# Patient Record
Sex: MALE | Race: WHITE
[De-identification: names, ages, dates, MRNs, and addresses within clinical notes are randomized per-mention and may not be internally consistent; named-entity substitution may affect disease eponyms.]

---

## 2018-03-19 ENCOUNTER — HOSPITAL ENCOUNTER (INPATIENT)
Dept: HOSPITAL 68 - ERH | Age: 53
LOS: 3 days | DRG: 65 | End: 2018-03-22
Attending: INTERNAL MEDICINE | Admitting: INTERNAL MEDICINE
Payer: COMMERCIAL

## 2018-03-19 VITALS — WEIGHT: 205.37 LBS | BODY MASS INDEX: 32.61 KG/M2 | HEIGHT: 66.5 IN

## 2018-03-19 DIAGNOSIS — I16.1: ICD-10-CM

## 2018-03-19 DIAGNOSIS — Z79.84: ICD-10-CM

## 2018-03-19 DIAGNOSIS — Z91.14: ICD-10-CM

## 2018-03-19 DIAGNOSIS — I63.411: Primary | ICD-10-CM

## 2018-03-19 DIAGNOSIS — D72.829: ICD-10-CM

## 2018-03-19 DIAGNOSIS — E66.9: ICD-10-CM

## 2018-03-19 DIAGNOSIS — E78.5: ICD-10-CM

## 2018-03-19 DIAGNOSIS — I69.351: ICD-10-CM

## 2018-03-19 DIAGNOSIS — I50.32: ICD-10-CM

## 2018-03-19 DIAGNOSIS — I11.0: ICD-10-CM

## 2018-03-19 DIAGNOSIS — Z87.891: ICD-10-CM

## 2018-03-19 DIAGNOSIS — E11.9: ICD-10-CM

## 2018-03-19 LAB
ABSOLUTE GRANULOCYTE CT: 8.6 /CUMM (ref 1.4–6.5)
BASOPHILS # BLD: 0 /CUMM (ref 0–0.2)
BASOPHILS NFR BLD: 0.3 % (ref 0–2)
EOSINOPHIL # BLD: 0.1 /CUMM (ref 0–0.7)
EOSINOPHIL NFR BLD: 1 % (ref 0–5)
ERYTHROCYTE [DISTWIDTH] IN BLOOD BY AUTOMATED COUNT: 13.5 % (ref 11.5–14.5)
GRANULOCYTES NFR BLD: 69.7 % (ref 42.2–75.2)
HCT VFR BLD CALC: 45.6 % (ref 42–52)
LYMPHOCYTES # BLD: 2.2 /CUMM (ref 1.2–3.4)
MCH RBC QN AUTO: 28.3 PG (ref 27–31)
MCHC RBC AUTO-ENTMCNC: 33.5 G/DL (ref 33–37)
MCV RBC AUTO: 84.5 FL (ref 80–94)
MONOCYTES # BLD: 1.3 /CUMM (ref 0.1–0.6)
PLATELET # BLD: 269 /CUMM (ref 130–400)
PMV BLD AUTO: 9.2 FL (ref 7.4–10.4)
RED BLOOD CELL CT: 5.4 /CUMM (ref 4.7–6.1)
WBC # BLD AUTO: 12.3 /CUMM (ref 4.8–10.8)

## 2018-03-19 PROCEDURE — 86618 LYME DISEASE ANTIBODY: CPT

## 2018-03-19 PROCEDURE — 70551 MRI BRAIN STEM W/O DYE: CPT

## 2018-03-19 NOTE — CT SCAN REPORT
EXAMINATION:
CT HEAD WITHOUT CONTRAST
 
CLINICAL INFORMATION:
Right-sided numbness
 
COMPARISON:
None
 
TECHNIQUE:
Contiguous axial imaging was performed from the skull base to vertex without
intravenous administration of contrast.
 
 
 
FINDINGS: There are areas of decreased attenuation on the left here. Adjacent
to the body of left lateral ventricle and some decreased attenuation in the
region of the basal ganglia/posterior limb of the internal capsule on the
left. Also head of the caudate. Importantly there is no hemorrhage here. No
midline shift or definitive mass effect. Basal cisterns appear patent.
Posterior fossa risk grossly within normal limits. No extra-axial collection.
 
Decreased attenuation right occipital region. This may represent an area of
old infarction. Again no mass effect.
 
IMPRESSION: Scattered areas of decreased attenuation here are noted. Most
left-sided. Some these may represent old infarcts but others are more
indeterminate and may be acute or subacute in nature. Given the history MR
including diffusion-weighted imaging would be recommended to further
evaluate. Importantly there is no hemorrhage or midline shift or mass effect
here.

## 2018-03-19 NOTE — RADIOLOGY REPORT
EXAMINATION:
XR PORTABLE CHEST
 
CLINICAL INFORMATION:
Hypertensive
 
COMPARISON:
None
 
TECHNIQUE:
Portable frontal view of the chest was obtained.
 
FINDINGS:
No finding in this portable study. There is no infiltrate or failure. No
effusion. Some limitation from patient body habitus.
 
IMPRESSION:
Negative acute portable chest

## 2018-03-19 NOTE — ED NEURO DEFICIT/STROKE
History of Present Illness
 
General
Chief Complaint: General Adult
Stated Complaint: LIGHTHEADED, NUMB ON RIGHT SIDE PER PT
Source: patient, family, old records
Exam Limitations: no limitations
 
Vital Signs & Intake/Output
Vital Signs & Intake/Output
 Vital Signs
 
 
Date Time Temp Pulse Resp B/P B/P Pulse O2 O2 Flow FiO2
 
     Mean Ox Delivery Rate 
 
2229 97.2 65 20 191/88  97 Room Air  
 
2211  70 20 212/99     
 
2147  72 20 204/104     
 
2138 97.0 72 20 204/104  97 Room Air  
 
2114    180/90     
 
2102  72 20 200/100     
 
2054  72 20 200/100  98 Room Air  
 
2053 97.0 73 20 214/107  97 Room Air  
 
2023 98.1 75 18   95 Room Air  
 
 
 
Allergies
Coded Allergies:
No Known Allergies (18)
 
Triage Note:
PT FROM HOME C/O DIZZNIESS X1 DAY, RIGHT SIDED
"NUMBNESS". PT IS NON COMPLIANT WITH HTN
MEDICATION, HYPERLIPIDEMIA MEDICATION AND DOES NOT
CHECK BSG DAILY. PT STATES BLURRY VISION
BILATERALLY SINCE YESTERDAY AND RIGHT SIDED
NUMBNESS SINCE 630. PT HYPERTENSIVE IN TRIAGE
221/116. PTS HR 80, AFEBRILE. PT IS NEGATIVE NIH
STROKE SCALE AT THIS TIME.
Triage Nurses Notes Reviewed? yes
Onset: Abrupt
Duration: day(s): (1), constant
Timing: recent history
Severity: moderate
Altered Sensations: RUE, RLE
Vision Problem? No
HPI:
52-year-old male noncompliant with his medication history of hypertension 
diabetes high cholesterol presents complaining of room spinning dizziness 
yesterday associated with right-sided arm and leg numbness heaviness today.  He 
also states that he has had difficulty articulating his words however there's 
been no slurred speech per family.  No facial droop.  No recent fall or head 
trauma.  On arrival patient is noted to be hypertensive.  He denies any chest 
pain shortness of breath.  No neck arm or leg pain abdominal pain nausea or 
vomiting.
 
No history of similar symptoms in the past.  His wife states that he appears to 
be shuffling his right foot.  No weakness in his arm or leg no vision changes he
took 2 full dose aspirin prior to arrival
(Agustin CARMONA,Kvng)
 
Past History
 
Travel History
Traveled to [place] past 21 day No
 
Medical History
Any Pertinent Medical History? see below for history
Cardiovascular: hypertension, hyperlipidemia
Endocrine: diabetes
 
Surgical History
Surgical History: non-contributory
 
Psychosocial History
What is your primary language English
Tobacco Use: Quit >30 days ago
 
Family History
Hx Contributory? No
(Kvng Stoll)
 
Review of Systems
 
Review of Systems
Constitutional:
Reports: see HPI. 
Comments
Review of systems: See HPI, All other systems negative.
Constitutional, no chills no fever,
HEENT:  no sore throat no congestion
Cardiovascular: No chest pain , no palpitation
Skin:  no rashes, no change in skin
Respiratory: No dyspnea no cough no  sputum 
GI: No nausea no vomiting, no diarrhea
: No dysuria 
Muscle skeletal: No joint pain, no back pain, no neck pain,
Neurologic: , no headache
Heme/endocrine: No bruising 
Immunology: No lymphadenopathy
(Agustin CARMONA,Kvng)
 
Physical Exam
 
Physical Exam
General Appearance: well developed/nourished, alert, awake
Psychiatric: awake, alert, oriented x 3
Cranial Nerves: normal hearing, normal speech, PERRL
Coordination/Gait: normal finger to nose, normal gait
Motor/Sensory: no motor/sensory deficits
Skin: intact
Comments:
Well-developed well-nourished person in no acute distress
HEENT: Normal EENT exam; PERRL, EOMI, no nystagmus. HEAD is atraumatic. moist 
mucous membranes.  
Neck: Supple, y, normal range of motion 
Back: Nontender, no CVA tenderness. Full range of motion
Cardiovascular: Regular rate and rhythms no murmurs
Respiratory: . No respiratory distress.  Patient speaking in full complete 
sentences. Breath sounds clear to auscultation bilaterally: NO W/R/R
Abdomen: Soft, nontender nondistended, no appreciable organomegaly. Normal bowel
sounds. No rebound/guarding,
Extremity: No edema, full range of motion of extremities, normal and equal 
pulses bilaterally, 5 out of 5 strength noted to bilateral upper and lower 
extremities
Neuro: Alert oriented x3, motor sensory normal, cranial nerves II through XII 
grossly intact. There were no obvious focal neurologic abnormalities.
Skin: No appreciable rash on exposed skin, skin is warm and dry.
Psych: Mood and affect is normal, memory and judgment is normal.
 
Core Measures
CVA/TIA Diagnosis: Yes
NIH Stroke Scale
 
 
NIH Stroke Scale Response Value
 
Level of Consciousness alert 0
 
LOC Questions answers one correctly 1
 
LOC Commands obeys both correctly 0
 
Best Gaze normal 0
 
Visual Fields no visual loss 0
 
Facial Paresis normal 0
 
Motor Arm - Left no drift 0
 
Motor Arm - Right no drift 0
 
Motor Leg - Left no drift 0
 
Motor Leg - Right no drift 0
 
Limb Ataxia no ataxia 0
 
Sensory normal 0
 
Best Language no aphasia 0
 
Dysarthria normal articulation 0
 
Extinction and Inattention no neglect 0
 
Total   1
 
 
Date Last Known Well: 18
Time Last Known Well: 0600
Symptom Start Date: 18
Symptom Start Time: 06
Swallow Evaluation Pass
Swallow eval date 18
Swallow eval time 
Sepsis Present: No
Sepsis Focused Exam Completed? No
(Kvng Stoll)
 
Progress
Differential Diagnosis: drug intoxication, electrolyte imbalance, intracranial 
Hem., intracranial mass/tumor, stroke, vertebrobasilar insuff., LACUNAR INFARCT,
HYPERTENSIVE URGENCY VS EMERGENCY
Plan of Care:
 Orders
 
 
Procedure Date/time Status
 
Heart Healthy Diet  D Active
 
Patient Data 2242 Active
 
ED Holding Orders 2226 Active
 
Admit to inpatient 2226 Active
 
Vital Signs 2226 Active
 
Code Status 2226 Active
 
Telemetry/Cardiac Monitor 2052 Active
 
TROPONIN LEVEL 2023 Complete
 
COMPREHENSIVE METABOLIC PANEL 2023 Complete
 
CBC WITHOUT DIFFERENTIAL 2023 Complete
 
EKG 2023 Active
 
 
 Laboratory Tests
 
 
 
18:
Anion Gap 15, Estimated GFR > 60, BUN/Creatinine Ratio 15.7, Glucose 122  H, 
Calcium 9.5, Total Bilirubin 0.8, AST 50,   H, Alkaline Phosphatase 70, 
Troponin I 0.03, Total Protein 7.6, Albumin 4.6, Globulin 3.0, Albumin/Globulin 
Ratio 1.5, CBC w Diff NO MAN DIFF REQ, RBC 5.40, MCV 84.5, MCH 28.3, MCHC 33.5, 
RDW 13.5, MPV 9.2, Gran % 69.7, Lymphocytes % 18.2  L, Monocytes % 10.8  H, 
Eosinophils % 1.0, Basophils % 0.3, Absolute Granulocytes 8.6  H, Absolute 
Lymphocytes 2.2, Absolute Monocytes 1.3  H, Absolute Eosinophils 0.1, Absolute 
Basophils 0
Symptoms started approximately 6 AM when the patient woke up he is outside the 
TPA window.  There is no neurologic deficits noted at this time.  Patient 
medicated labetalol labs ordered CAT scan chest XRAY ordered.  Case discussed 
with Dr. Chaudhari agrees with plan
 
 
 
Discussed with the patient and his wife is CAT scan and lab results.  And need 
for admission he took 2 full dose aspirin prior to arrival patient tolerate 
swallow evaluation well.  Case discussed with Dr. brothers will admit
Diagnostic Imaging:
Viewed by Me: Radiology Read, CT Scan.  Discussed w/RAD: Radiology Read, CT 
Scan. 
Radiology Impression: PATIENT: ANGLE ANN  MEDICAL RECORD NO: [id number] 
PRESENT AGE: 52  PATIENT ACCOUNT NO: [id number] : [birth year]  LOCATION: [facility] 
ORDERING PHYSICIAN: Kvng CARMONA     SERVICE DATE:  EXAM TYPE: 
RAD - XRY-PORTABLE CHEST XRAY EXAMINATION: XR PORTABLE CHEST CLINICAL 
INFORMATION: Hypertensive COMPARISON: None TECHNIQUE: Portable frontal view of 
the chest was obtained. FINDINGS: No finding in this portable study. There is no
infiltrate or failure. No effusion. Some limitation from patient body habitus. 
IMPRESSION: Negative acute portable chest DICTATED BY: Calderon Flores MD  DATE/
TIME DICTATED:18 :RAD.CARABALLO  DATE/TIME TRANSCRIBED:
18 CONFIDENTIAL, DO NOT COPY WITHOUT APPROPRIATE AUTHORIZATION.  <
Electronically signed in Other Vendor System>                                   
                                                    SIGNED BY: Calderon Flores MD
18, PATIENT: ANGLE ANN  MEDICAL RECORD NO: [id number] PRESENT AGE:
52  PATIENT ACCOUNT NO: [id number] : [birth year]  LOCATION: [facility] ORDERING PHYSICIAN:
Kvng CARMONA     SERVICE DATE:  EXAM TYPE: CAT - CT HEAD WO IV 
CONTRAST EXAMINATION: CT HEAD WITHOUT CONTRAST CLINICAL INFORMATION: Right-sided
numbness COMPARISON: None TECHNIQUE: Contiguous axial imaging was performed from
the skull base to vertex without intravenous administration of contrast. 
FINDINGS: There are areas of decreased attenuation on the left here. Adjacent to
the body of left lateral ventricle and some decreased attenuation in the region 
of the basal ganglia/posterior limb of the internal capsule on the left. Also 
head of the caudate. Importantly there is no hemorrhage here. No midline shift 
or definitive mass effect. Basal cisterns appear patent. Posterior fossa risk 
grossly within normal limits. No extra-axial collection. Decreased attenuation 
right occipital region. This may represent an area of old infarction. Again no 
mass effect. IMPRESSION: Scattered areas of decreased attenuation here are 
noted. Most left-sided. Some these may represent old infarcts but others are 
more indeterminate and may be acute or subacute in nature. Given the history MR 
including diffusion-weighted imaging would be recommended to further evaluate. 
Importantly there is no hemorrhage or midline shift or mass effect here. 
DICTATED BY: Calderon Flores MD  DATE/TIME DICTATED:18 
:RAD.CARABALLO  DATE/TIME TRANSCRIBED:18 CONFIDENTIAL, 
DO NOT COPY WITHOUT APPROPRIATE AUTHORIZATION.  <Electronically signed in Other 
Vendor System>                                                                  
                     SIGNED BY: Calderon Flores MD 18
Initial ED EKG: NSR AT 70, T WAVE INVERSIONS LATERAL LEADS, NORMAL AXIS
Rhythm Strip: normal sinus rhythm
(Kvng Stoll)
 
Departure
 
Departure
Time of Disposition: 
Disposition: STILL A PATIENT
Condition: Stable
Clinical Impression
Primary Impression: CVA (cerebral vascular accident)
Secondary Impressions: Hypertensive urgency
Referrals:
Neo Anthony DO (PCP/Family)
 
Departure Forms:
Customer Survey
General Discharge Information
 
Admission Note
Spoke With:
Gem Brothers MD
Documentation of Exam:
Documentation of any treatments & extenuating circumstances including Concerns 
Regarding Discharge (functional status, medication knowledge or non-compliance, 
living conditions, etc.) that warrant an admission rather than observation: 
neuro, cardio consult, MRI, Neuro checks, trend labs tele moniotiring, premature
discharge would be medically harmful
 
(Kvng Stoll)
 
PA/NP Co-Sign Statement
Statement:
ED Attending supervision documentation-
 
[X] I saw and evaluated the patient. I have also reviewed all the pertinent lab 
results and diagnostic results. I agree with the findings and the plan of care 
as documented in the PA's/NP's documentation. 
 
[X] I have reviewed the ED Record and agree with the PA's/NP's documentation.
 
[] Additions or exceptions (if any) to the PAs/NP's note and plan are 
summarized below:
[ADMIT TO TELE, SERIAL ENZYMES, TELE MONITORING, NEUROLOGY CONSULTATION]
 
(Watson KAMARA,Dangelo BOB)
 
 
(Watson KAMARA,Dangelo BOB)

## 2018-03-19 NOTE — HISTORY & PHYSICAL
Thomas KAMARA,[facility] 03/19/18 3618:
General Information and HPI
MD Statement:
I have seen and personally examined ANGLE HERNANDEZ and documented this H&P.
 
The patient is a 52 year old M who presented with a patient stated chief 
complaint of [right sided numbness].
 
Source of Information: patient, family
Exam Limitations: no limitations
History of Present Illness:
Mr. Hernandez is a 52-year-old gentleman with past medical history significant for 
hypertension, hyperlipidemia and diabetes noncompliant with medications presents
with right-sided numbness/heaviness and slurring of speech that started this 
morning.
According to the patient, he has been feeling lightheaded or dizzy since 
yesterday but after waking up this morning he noticed numbness on the entire 
right side and difficulty articulating words with slurring of speech. He went to
work but continued to have the symptoms and took 2 regular aspirin tablets.  
Also reports slight blurring of vision on and off that started around mid day.  
Returned from work around 4 PM, when the wife noticed that he is off balance, 
trying to hold on things while walking with right foot shuffling and also 
noticed slurred speech.  Wife checked his blood sugar level at that time which 
was 240, also gave him his blood pressure medications and brought him to the ER.
 He denies any headache, loss of consciousness, confusion, facial droop, 
previous similar episodes, recent change in medications, over-the-counter 
medications and a recent illnesses.  He says he does not feel any numbness on 
the right side any more and the slurred speech and blurry vision has resolved 
but he feels tired.
She has not been taking his medications for the past couple of months, and even 
before that has been taking them on and off. 
 
Allergies/Medications
Allergies:
Coded Allergies:
No Known Allergies (03/19/18)
 
 
Past History
 
Travel History
Traveled to [place] past 21 day No
 
Medical History
Cardiovascular: hypertension, hyperlipidemia
Endocrine: diabetes
 
Surgical History
Surgical History: non-contributory
 
Past Family/Social History
 
Psychosocial History
Where do you live? Home
Who Do You Live With? spouse
Primary Language: English
Smoking Status: Former Smoker
ETOH Use: denies use
Illicit Drug Use: denies illicit drug use
 
Functional Ability
ADLs
Independent: dressing, eating, toileting, bathing. 
Ambulation: independent
IADLs
Independent: shopping, housework, finances, food prep, telephone, transportation
, medication admin. 
 
Review of Systems
 
Review of Systems
Constitutional:
Reports: malaise, weakness. 
EENTM:
Reports: blurred vision. 
Cardiovascular:
Reports: no symptoms. 
Respiratory:
Reports: no symptoms. 
GI:
Reports: no symptoms. 
Genitourinary:
Reports: no symptoms. 
Musculoskeletal:
Reports: no symptoms. 
Skin:
Reports: no symptoms. 
Neurological/Psychological:
Reports: numbness. 
Hematologic/Endocrine:
Reports: no symptoms. 
Immunologic/Allergic:
Reports: no symptoms. 
All Other Systems: Reviewed and Negative
 
Exam & Diagnostic Data
Last 24 Hrs of Vital Signs/I&O
 Vital Signs
 
 
Date Time Temp Pulse Resp B/P B/P Pulse O2 O2 Flow FiO2
 
     Mean Ox Delivery Rate 
 
03/20 0214  62  178/102     
 
03/20 0126 98.2 69 20 190/100  94 Room Air  
 
03/19 2346 97.3 62 18 185/95  97 Room Air  
 
03/19 2229 97.2 65 20 191/88  97 Room Air  
 
03/19 2211  70 20 212/99     
 
03/19 2147  72 20 204/104     
 
03/19 2138 97.0 72 20 204/104  97 Room Air  
 
03/19 2114    180/90     
 
03/19 2102  72 20 200/100     
 
03/19 2054  72 20 200/100  98 Room Air  
 
03/19 2053 97.0 73 20 214/107  97 Room Air  
 
03/19 2023 98.1 75 18   95 Room Air  
 
 
 Intake & Output
 
 
 03/20 0800 03/20 0000 03/19 1600
 
Intake Total   
 
Output Total   
 
Balance   
 
    
 
Patient  230 lb 
 
Weight   
 
Weight  Reported by Patient 
 
Measurement   
 
Method   
 
 
 
 
Physical Exam
General Appearance Alert, Oriented X3, Cooperative, No Acute Distress
Skin No Rashes, No Breakdown
HEENT Atraumatic, PERRLA, EOMI, Mucous Membr. moist/pink
Neck Supple, No JVD
Cardiovascular Regular Rate, Normal S1, Normal S2, No Murmurs
Lungs Clear to Auscultation, Normal Air Movement
Abdomen Normal Bowel Sounds, Soft, No Tenderness
Neurological Normal Gait, Normal Speech, Strength at 5/5 X4 Ext, Normal Tone, 
Sensation Intact, Cranial Nerves 3-12 NL, Reflexes 2+, positive Romberg's test, 
per patient right foot dragging while walking and feels stiff,  ,    negative 
cerebellar signs, no pronator drift
Extremities No Clubbing, No Cyanosis, No Edema, Normal Pulses
Last 24 Hrs of Labs/Chon:
 Laboratory Tests
 
03/20/18 0140:
Hemoglobin A1c Pending, Troponin I Pending, Triglycerides Pending, Cholesterol 
Pending, LDL Cholesterol, Calc Pending, HDL Cholesterol Pending, Cholesterol/HDL
Ratio Pending
 
03/19/18 2032:
Anion Gap 15, Estimated GFR > 60, BUN/Creatinine Ratio 15.7, Glucose 122  H, 
Calcium 9.5, Total Bilirubin 0.8, AST 50,   H, Alkaline Phosphatase 70, 
Troponin I 0.03, Total Protein 7.6, Albumin 4.6, Globulin 3.0, Albumin/Globulin 
Ratio 1.5, CBC w Diff NO MAN DIFF REQ, RBC 5.40, MCV 84.5, MCH 28.3, MCHC 33.5, 
RDW 13.5, MPV 9.2, Gran % 69.7, Lymphocytes % 18.2  L, Monocytes % 10.8  H, 
Eosinophils % 1.0, Basophils % 0.3, Absolute Granulocytes 8.6  H, Absolute 
Lymphocytes 2.2, Absolute Monocytes 1.3  H, Absolute Eosinophils 0.1, Absolute 
Basophils 0
 
 
Diagnostic Data
EKG Results
Normal Sinus Rhythm
Heart rate 69
T-wave inversion in leads 1, aVL,V5-6. 
QTc 437
CXR Results
FINDINGS:
No finding in this portable study. There is no infiltrate or failure. No
effusion. Some limitation from patient body habitus.
 
IMPRESSION:
Negative acute portable chest
Other Results
CT HEAD WO IV CONTRAST
FINDINGS: There are areas of decreased attenuation on the left here. Adjacent
to the body of left lateral ventricle and some decreased attenuation in the
region of the basal ganglia/posterior limb of the internal capsule on the
left. Also head of the caudate. Importantly there is no hemorrhage here. No
midline shift or definitive mass effect. Basal cisterns appear patent.
Posterior fossa risk grossly within normal limits. No extra-axial collection.
 
Decreased attenuation right occipital region. This may represent an area of
old infarction. Again no mass effect.
 
IMPRESSION: Scattered areas of decreased attenuation here are noted. Most
left-sided. Some these may represent old infarcts but others are more
indeterminate and may be acute or subacute in nature. Given the history MR
including diffusion-weighted imaging would be recommended to further
evaluate. Importantly there is no hemorrhage or midline shift or mass effect
here.
 
Assessment/Plan
Assessment:
Mr. Hernandez is a 52-year-old gentleman with past medical history significant for 
hypertension, hyperlipidemia and diabetes noncompliant with medications presents
with right-sided numbness/heaviness and slurring of speech that started this 
morning.
 
Problem list
1.  Hypertensive emergency from medication noncompliance
2.  Stroke
3.  History of hyperlipidemia and diabetes
4.  Leukocytosis
 
- We will admit the patient to telemetry floor.
- Neuro checks every 4 hours
- Patient received IV Labetolol 10mg  2 and Enalaprilat 1.25mg once in ER, 
blood pressure down to 178/110 from 214/107. 
- Continue metoprolol and lisinopril, will goal systolic blood pressure of 170.
- Willl start the Patient on Aspirin 81 mg daily.
- Continue Crestor 40 mg daily.
- MRI brain in am. 
- Carotid ultrasound.
- Troponins and EKG 3 to rule out ACS.
- Echocardiogram was in April,2017 showing an ejection fraction of greater than 
65% and mild to moderate left ventricle hypertrophy.  We will repeat 
echocardiogram given medication noncompliance and persistently elevated blood 
pressure.
- Check lipid panel, A1c, TSH and free T4.
- Obtain U tox.
- Neurology consult in a.m.
- Cardiology consult in a.m.
- Patient passed bedside swallow evaluation. 
- PT OT evaluation.
- We will hold metformin and start the patient on NovoLog sliding scale and Accu
-Cheks.
 
DVT prophylaxis; subcutaneous Lovenox
Patient is full code
 
As Ranked By This Provider
Problem List:
 1. CVA (cerebral vascular accident)
 
 2. Hypertensive emergency
 
 
Core Measures/Misc (9/17)
 
Acute Coronary Syndrome
ACS Diagnosis: No
 
Congestive Heart Failure
Congestive Heart Failure Diagnosis No
 
Cerebrovascular Accident
CVA/TIA Diagnosis: Yes
Date Last Known Well: 03/19/18
Time Last Known Well: 0600
Symptom Start Date: 03/19/18
Symptom Start Time: 0600
Swallow Evaluation Pass
 
VTE (View Protocol)
VTE Risk Factors Age>40
No Mechanical VTE Prophylaxis d/t N/A MechProphylax Ordered
No VTE Pharm Prophylaxis d/t NA PharmProphylax ordered
 
Sepsis (View protocol)
Sepsis Present: No
 
Willy Irizarry 03/20/18 0124:
General Information and HPI
 
Allergies/Medications
Home Med list
Lisinopril 20 MG TABLET   1 TAB PO DAILY htn  (Reported)
Metformin HCl 500 MG TABLET   1 TAB PO BID diabetes  (Reported)
Metoprolol Tartrate 50 MG TABLET   1 TAB PO BID hypertension  (Reported)
Rosuvastatin Calcium (Crestor) 40 MG TABLET   1 TAB PO DAILY hlp  (Reported)
 
 
 
Resident Review Statement
Resident Statement: examined this patient, discussed with intern, agreed with 
intern, discussed with family, reviewed EMR data (avail), discussed with nursing
, discussed with case mgmt, reviewed images, amended to note
Other Findings:
This is a 52-year-old male with past medical history significant for 
hypertension, hyperlipidemia, type 2 diabetes mellitus, noncompliant with 
medications presented to the emergency room with chief complaint of right-sided 
numbness since morning.
 
Patient has history of hypertension, hyperlipidemia, diabetes mellitus for last 
2-3 years.  He is very noncompliant with all the medications.  He takes them on 
and off.  He stopped taking lisinopril, metoprolol, Crestor, metformin since 10/
2017.  Never checks blood pressure or blood sugar at home.  He follows up with 
his primary care physician.  Echocardiogram was done in 2017 which showed left 
ventricular hypertrophy, normal systolic and diastolic function.
 
Patient was in his usual state of health until 03/19/2018 6:30 AM.  He states 
that he has right-sided numbness, pins and needles sensation around 6:30 AM.  
However he went to his work, around midday he noticed blurry vision, on and off.
 By the time he reached home his wife noticed that he has slurry speech and 
shuffling gait.  He then presented to the emergency room for further evaluation.
 He took aspirin 3252 at his work.  Patient reports lightheadedness/dizziness 
yesterday.  He denied any weakness/strength abnormalities. 
 
Review of systems positive for right-sided numbness, pins and needles sensations
, right-sided stiffness.  Denies any vision abnormalities, speech abnormalities 
in the emergency room.  He is alert awake and oriented 3.  Denied any chest 
pain, palpitations, short of breath, fever, chills, productive cough, headache, 
neck pain, nausea, vomiting, abdominal pain, change in bladder or bowel habits. 
He quit smoking years back.  Denied alcohol abuse, illicit drug abuse.
 
--------------------------------------------------------------------------------
----------
 
Vitals 
afebrile, heart rate 62, respiratory rate 18, blood pressure 214/107- 180/90, 
after receiving IV labetalol 10 mg twice, enalaprit, respiratory rate 18, 
saturating at 97 on room
 
on exam
Well-developed well-nourished person in no acute distress
HEENT: PERRL, EOMI, no nystagmus. HEAD is atraumatic. moist mucous membranes.  
Neck: Supple,normal range of motion 
Cardiovascular: Regular rate and rhythms no murmurs
Respiratory: . No respiratory distress.  Breath sounds clear to auscultation 
bilaterally
Abdomen: Soft, nontender nondistended, Normal bowel sounds.
Extremity: No edema, full range of motion of extremities, normal and equal 
pulses bilaterally
5 out of 5 strength noted in bilateral upper and lower extremities
Alert oriented x3, 
sensory exam normal, cranial nerves II through XII grossly intact. There were no
obvious focal neurologic abnormalities.
Cerebellar exam-normal finger-nose test, no dysdiadochokinesia.  Positive 
Romberg's test.  Normal gait.  Reports right-sided stiffness when he walks
 
Pertinent labs
WBC 12.3, hemoglobin 15, hematocrit 40, platelets 269
CMP within normal limits

EKG normal sinus rhythm, rate 69, no ST-T wave changes.  T-wave inversions V5, 
V6, 1 and aVL
Head CT - Scattered areas of decreased attenuation  are noted. Most left-sided. 
Some these may represent old infarcts but others are more indeterminate and may 
be acute or subacute in nature. there is no hemorrhage or midline shift or mass 
effect
 
 
--------------------------------------------------------------------------------
---------
 
1.  Acute ischemic CVA
Patient reports right-sided numbness, blurry vision, slurred speech, shuffling 
gait started around 6:30 AM.  He is out of window for TPA.  He is alert awake 
and oriented 3, with no focal neurologic deficits on exam, denied any vision 
abnormality, speech abnormality.  However continues to have right-sided 
stiffness, some numbness, shuffling gait.  CAT scan head was done which showed 
no hemorrhage, midline shift, mass effect.  However scattered areas of decreased
attenuation are noted on left side.  Also found to old infarcts.  Patient has no
history of mini strokes in the past.
However given his neurologic findings, CAT scan findings will admit him to 
telemetry floor for management of acute CVA.
 
* Admit to telemetry
* Monitor vitals every shift
* NIH stroke scale
* Fall precautions
* Serial troponin, EKG
* Received 325 mg aspirin twice
* Continue aspirin 81 daily
* Continue Crestor 40 mg daily
* Will get MRI head without contrast 
* Carotid Doppler ultrasound to look for any atherosclerosis/stenosis.
* Echocardiogram to look for valvular abnormalities
* Passed bedside swallow
* Will get PT OT speech, swallow evaluation in the a.m.
* Neurology consult
* Cardiology consult
 
 
 
Hypertensive urgency
Patient presented with blood pressure 214/107, stopped taking lisinopril, 
metoprolol since 10/2017.  Presented with right-sided numbness.  Denies any 
headache, chest pain.  He received  IV labetalol 10 mg twice, enalaprit in the 
ER.  His blood pressure continues to remain around 180-190. he also took one-
time dose lisinopril and metoprolol before heading to ER.
 
* For patients with ischemic stroke who are not treated with thrombolytic 
therapy, blood pressure should not be treated acutely unless the hypertension is
extreme (systolic blood pressure >220 mmHg or diastolic blood pressure >120 mmHg
), or the patient has active ischemic coronary disease, heart failure, aortic 
dissection, hypertensive encephalopathy, or pre-eclampsia/eclampsia. 
* Will do cautious lowering of blood pressure by approximately 15 percent during
the first 24 hours.
* will keep goal sbp- 180 for now. 
* Cardiology consult in a.m. for hypertensive urgency
* Follow up serial troponin and EKG
* Follow-up echocardiogram
* Continue home medication lisinopril 20 mg daily and metoprolol 50 mg twice 
daily
* IV blood pressure medications as needed for now
* Hourly blood pressure monitoring
 
 
3.  Diabetes mellitus
* Patient stopped taking metformin for few months.
* Accu-Cheks
* Insulin sliding scale
 
 
4.  Leukocytosis
* No source of infection was found
* Trend WBC a.m.
 
 
DVT prophylaxis subcutaneous Lovenox
Patient is full code
Regular diet
 
 
 
 
 
 
 
 
Scott KAMARA, [facility] 03/20/18 0448:
Attending MD Review Statement
 
Attending Statement
Attending MD Statement: examined this patient, discuss w/resident/PA/NP, agreed 
w/resident/PA/NP, discussed with family, reviewed images, amended to note
Attending Assessment/Plan:
53 yo obese M with h/o HTN, T2DM, HLD, noncompliant with his medications, is 
here for evaluation of sudden onset right sided paresthesias, blurry vision, 
slurring of speech and unsteady gait since about 6.30 am on morning of 
admission. Patient reports having dizziness (feeling off) 1 day prior, but was 
reportedly normal as per wife. Earlier this morning, he woke up, felt right 
sided tingling/ numbness/ heaviness. He went to work ( by profession), 
felt intermittent blurring of vision, took 2 full dose aspirins and went back 
home. His wife noted that his speech was slow and slurred. She was finding it 
difficult to understand his words. He had a shuffling gait as if he was dragging
his right leg. No facial droop or hemiparesis. Speech has cleared up now.
 
Vitals stable except for /107 --> 191/88 --> 178/102. 
Neuro exam: AAO, no facial droop, tongue/ uvula central, speech clear, cranial 
nerves intact, no pronator drift, power 5/5, sensation intact, plantars 
downgoing, reflexes 2+, Finger-nose and alternating movements normal. Romberg's 
positive (sways backwards), Gait is slow with patient reporting right sided 
stiffness on walking. 
Labs: WBC 12.3, glucose 122, trop neg. 
CXR: negative.
CT head: scattered areas of decreased attenuation, left sided  acute or 
subacute infarcts. 
EKG: sinus rhythm, TWI in I, aVL, V5-6 (no old EKG).
Echo (2017): EF >65%.
** Passed bedside swallow eval.
 
Assessment and plan:
1. Acute vs subacute ischemic stroke
2. Uncontrolled hypertension, hypertensive emergency
3. Type 2 diabetes mellitus
4. Hyperlipidemia
5. Noncompliance with medications
6. Obesity
 
- Admit to Telemetry
- Neurochecks Q2
- Monitor for arrhythmias, underlying atrial fibrillation
- Serial EKG and troponin
- Obtain echo, carotid dopplers
- Maintain permissive hypertension SBP ~ 160-180's
- Aspirin, high dose statin
- MRI head with and without GUNJAN
- Cardio and Neuro consult
- PT/ OT consult
- Speech/ swallow eval
- Resume lisinopril, metoprolol in AM
- Hold metformin, monitor accucheks, HbA1c, insulin SS
- Check lipid panel, TSH, free T4.
- Counseled about medication compliance
DVT ppx Lovenox. Full code.

## 2018-03-20 VITALS — SYSTOLIC BLOOD PRESSURE: 162 MMHG | DIASTOLIC BLOOD PRESSURE: 98 MMHG

## 2018-03-20 VITALS — SYSTOLIC BLOOD PRESSURE: 166 MMHG | DIASTOLIC BLOOD PRESSURE: 98 MMHG

## 2018-03-20 VITALS — DIASTOLIC BLOOD PRESSURE: 92 MMHG | SYSTOLIC BLOOD PRESSURE: 148 MMHG

## 2018-03-20 VITALS — SYSTOLIC BLOOD PRESSURE: 184 MMHG | DIASTOLIC BLOOD PRESSURE: 100 MMHG

## 2018-03-20 VITALS — DIASTOLIC BLOOD PRESSURE: 100 MMHG | SYSTOLIC BLOOD PRESSURE: 190 MMHG

## 2018-03-20 VITALS — SYSTOLIC BLOOD PRESSURE: 177 MMHG | DIASTOLIC BLOOD PRESSURE: 92 MMHG

## 2018-03-20 VITALS — DIASTOLIC BLOOD PRESSURE: 102 MMHG | SYSTOLIC BLOOD PRESSURE: 178 MMHG

## 2018-03-20 LAB
ABSOLUTE GRANULOCYTE CT: 5.5 /CUMM (ref 1.4–6.5)
BASOPHILS # BLD: 0 /CUMM (ref 0–0.2)
BASOPHILS NFR BLD: 0.5 % (ref 0–2)
EOSINOPHIL # BLD: 0.2 /CUMM (ref 0–0.7)
EOSINOPHIL NFR BLD: 1.8 % (ref 0–5)
ERYTHROCYTE [DISTWIDTH] IN BLOOD BY AUTOMATED COUNT: 13.5 % (ref 11.5–14.5)
GRANULOCYTES NFR BLD: 61.4 % (ref 42.2–75.2)
HCT VFR BLD CALC: 40.7 % (ref 42–52)
LYMPHOCYTES # BLD: 2.3 /CUMM (ref 1.2–3.4)
MCH RBC QN AUTO: 28.6 PG (ref 27–31)
MCHC RBC AUTO-ENTMCNC: 33.8 G/DL (ref 33–37)
MCV RBC AUTO: 84.8 FL (ref 80–94)
MONOCYTES # BLD: 1 /CUMM (ref 0.1–0.6)
PLATELET # BLD: 227 /CUMM (ref 130–400)
PMV BLD AUTO: 10.3 FL (ref 7.4–10.4)
RED BLOOD CELL CT: 4.81 /CUMM (ref 4.7–6.1)
WBC # BLD AUTO: 8.9 /CUMM (ref 4.8–10.8)

## 2018-03-20 NOTE — ULTRASOUND REPORT
EXAMINATION:
US DUPLEX CAROTID AND VERTEBRAL
 
CLINICAL INFORMATION:
Right-sided numbness.
 
COMPARISON:
There are no prior studies for comparison.
 
TECHNIQUE:
Real-time ultrasound and Doppler techniques (integrating B-mode 2D vascular
images, Doppler spectral analysis and color flow Doppler imaging) were
utilized to interrogate the extracranial carotid and vertebral arteries
bilaterally. The degree of stenosis determined by criteria similar to NASCET.
 
 
FINDINGS:
Right carotid system:
No calcified plaque or luminal narrowing is identified.
 
Right Carotid system peak systolic velocities (reported in centimeters per
second):
Proximal CCA: 132
Distal CCA: 107
Proximal ICA: 73
Mid ICA: 95
Distal ICA: 116
ECA: 104
Antegrade flow is demonstrated within the right vertebral artery.
 
Left Carotid system peak systolic velocities (reported in centimeters per
second):
Proximal CCA: 157
Distal CCA: 93
Proximal ICA: 69
Mid ICA: 88
Distal ICA: 81

Antegrade flow is demonstrated within the left vertebral artery.
 
IMPRESSION:
No hemodynamically significant narrowing is noted throughout either left or
right carotid systems. As noted, antegrade flow is demonstrated within both
left and right vertebral arteries.

## 2018-03-20 NOTE — CONS- CARDIOLOGY
General Information and HPI
 
Consulting Request
Date of Consult: 03/20/18
Requested By:
Adeline Rodriguez MD
 
History of Present Illness:
This patient is a 52 year old male with history of hypertension, dyslipidemia 
and diabetes. A couple days ago this patient noted the sudden onset of a 
numbness in his right arm and leg along with slurring of his speech. These 
symptoms were preceded by a lightheadedness or dizzy sensation and blurring of 
vision. He is very active at baseline and can play tennis without experiencing 
any chest pain, pressure, tightness, shortness of breath or lightheadedness. He 
does have occasional palpitations. 
 
The patient has been non-compliant with taking his medications.
 
 
Allergies/Medications
Allergies:
Coded Allergies:
No Known Allergies (03/19/18)
 
Home Med List:
Lisinopril 20 MG TABLET   1 TAB PO DAILY htn  (Reported)
Metformin HCl 500 MG TABLET   1 TAB PO BID diabetes  (Reported)
Metoprolol Tartrate 50 MG TABLET   1 TAB PO BID hypertension  (Reported)
Rosuvastatin Calcium (Crestor) 40 MG TABLET   1 TAB PO DAILY hlp  (Reported)
 
 
Review of Systems
Review of Systems:
A twelve point review of systems is unremarkable.
 
Past History
 
Travel History
Traveled to [place] past 21 day No
 
Medical History
Blood Transfusion Hx: No
Neurological: NONE
EENT: NONE
Cardiovascular: hypertension, hyperlipidemia
Respiratory: NONE
Gastrointestinal: NONE
Hepatic: NONE
Renal: NONE
Musculoskeletal: NONE
Psychiatric: NONE
Endocrine: diabetes
Blood Disorders: NONE
Cancer(s): NONE
GYN/Reproductive: NONE
 
Surgical History
Surgical History: non-contributory
 
Psychosocial History
Where Do You Live? Home
Who Do You Live With? spouse
Services at Home: None
Primary Language: English
Smoking Status: Former Smoker
ETOH Use: 2 beers per day
Illicit Drug Use: denies illicit drug use
 
Functional Ability
ADLs
Independent: dressing, eating, toileting, bathing. 
Ambulation: independent
IADLs
Independent: shopping, housework, finances, food prep, telephone, transportation
, medication admin. 
 
Exam & Diagnostic Data
Vital Signs and I&O
Vital Signs
 
 
Date Time Temp Pulse Resp B/P B/P Pulse O2 O2 Flow FiO2
 
     Mean Ox Delivery Rate 
 
03/20 1226    166/98     
 
03/20 1027  67  177/92     
 
03/20 0940 99.0 67 19 177/92  96 Room Air  
 
03/20 0600 98.2 68 20 148/92  97 Room Air  
 
03/20 0214  62  178/102     
 
03/20 0126 98.2 69 20 190/100  94 Room Air  
 
03/19 2346 97.3 62 18 185/95  97 Room Air  
 
03/19 2229 97.2 65 20 191/88  97 Room Air  
 
03/19 2211  70 20 212/99     
 
03/19 2147  72 20 204/104     
 
03/19 2138 97.0 72 20 204/104  97 Room Air  
 
03/19 2114    180/90     
 
03/19 2102  72 20 200/100     
 
03/19 2054  72 20 200/100  98 Room Air  
 
03/19 2053 97.0 73 20 214/107  97 Room Air  
 
03/19 2023 98.1 75 18   95 Room Air  
 
 
 Intake & Output
 
 
 03/20 1600 03/20 0800 03/20 0000 03/19 1600 03/19 0800 03/19 0000
 
Intake Total  110    
 
Output Total  500    
 
Balance  -390    
 
       
 
Intake, IV  10    
 
Intake, Oral  100    
 
Output, Urine  500    
 
Patient  209 lb 230 lb   
 
Weight      
 
Weight  Bed scale Reported by Patient   
 
Measurement      
 
Method      
 
 
 
Physical Exam:
General: WD/WN female in NAD; alert and oriented x 3
HEENT: NC/AT, PERRL, EOMI
Neck: no JVD, no carotid bruit
Heart: RRR w/o murmur
Lungs: clear bilaterally
ABdomen: soft, NT, +ve bowel sounds
Extremties: no edema
 
Assessment/Plan
Assessment/Plan
* This patient has symptoms, physical findings and imaging findings consistent 
with a CVA. This is likely the result of severe and prolonged hypertension. In 
addition to a low sodium diet this patient should be started on Labetolol 200mg 
BID, HCTZ 12.5mg daily and Lisinopril 20mg daily. We will aim for a systolic BP 
of 140mmHg over the next 24 hours. He presented with a pressure of 214mmHg and 
we have come down to about the 160 to 170 range which is a reasonable goal in 
the first 24 hours in the setting of an acute stroke. 
* Monitor this patient on telemetry for another 24 hours to assess for paroxysms
of atrial fibrillation which could be the source embolism. In the setting of 
severe hypertension this patient has a propensity for atrial fibrillation since 
hypertension is its most common cause. He has noted occasional palpitations.  
Obtain an echocardiogram. 
 
 
Consult Acknowledgment
- Thank you for your consult request.

## 2018-03-20 NOTE — PN- HOUSESTAFF
Andie Moore 18 0705:
Subjective
Follow-up For:
Ischemic CVA
HTN, hypertensive emergency
type 2 diabetes
 
Complaints: no complaints
Tele-Events Since Last Visit:
NSR. 
Subjective:
 
No new complaints, but feels like he doesnt feel improved. Continues to have 
weakness and numbness in right upper and lower extremities. 
 
 
Review of Systems
Constitutional:
Reports: see HPI. 
 
Objective
Last 24 Hrs of Vital Signs/I&O
 Vital Signs
 
 
Date Time Temp Pulse Resp B/P B/P Pulse O2 O2 Flow FiO2
 
     Mean Ox Delivery Rate 
 
 0600 98.2 68 20 148/92  97 Room Air  
 
 0214  62  178/102     
 
 0126 98.2 69 20 190/100  94 Room Air  
 
 2346 97.3 62 18 185/95  97 Room Air  
 
 2229 97.2 65 20 191/88  97 Room Air  
 
 2211  70 20 212/99     
 
 2147  72 20 204/104     
 
 2138 97.0 72 20 204/104  97 Room Air  
 
 2114    180/90     
 
 2102  72 20 200/100     
 
 2054  72 20 200/100  98 Room Air  
 
2053 97.0 73 20 214/107  97 Room Air  
 
2023 98.1 75 18   95 Room Air  
 
 
 Intake & Output
 
 
  0800  0000  1600
 
Intake Total 110  
 
Output Total   
 
Balance 110  
 
    
 
Intake, IV 10  
 
Intake, Oral 100  
 
Patient 209 lb 230 lb 
 
Weight   
 
Weight Bed scale Reported by Patient 
 
Measurement   
 
Method   
 
 
 
 
Physical Exam
General Appearance: Alert, Oriented X3, Cooperative, No Acute Distress
Skin: No Rashes, No Breakdown
Skin Temp/Moisture Exam: Warm/Dry
Sepsis Skin Exam (color): Normal for Ethnicity
HEENT: Atraumatic, PERRLA, EOMI
Neck: Supple, No JVD
Lymphatic: Cervical nl
Cardiovascular: Regular Rate, Normal S1, Normal S2, No Murmurs
Lungs: Normal Air Movement
Abdomen: Normal Bowel Sounds, Soft, No Tenderness
Neurological: Normal Speech, Cranial Nerves 3-12 NL, Reflexes 2+, left sided 
facial asymmetry right sided weakness- strength 4/5 on RUE, RLE sensations 
decreased in RUE, RLE plantars down going finger nose test intact. gait not 
tested, reflexes right knee reflex 3+
Extremities: No Cyanosis, No Edema
Vascular: Pulses Symmetrical
Current Medications:
 Current Medications
 
 
  Sig/Homero Start time  Last
 
Medication Dose Route Stop Time Status Admin
 
Acetaminophen 650 MG Q6P PRN  0030 AC 
 
  PO   
 
Aspirin 81 MG DAILY  1000 AC 
 
  PO   
 
Atorvastatin Calcium 80 MG 1700  1700 AC 
 
  PO   
 
Enalaprilat 1.25 MG ONCE ONE  2215 DC 
 
  IV  221  2211
 
Enalaprilat 0 .STK-MED ONE  2211 DC 
 
  IV   
 
Enoxaparin Sodium 40 MG DAILY  1000 AC 
 
  SC   
 
Insulin Aspart 0 TIDAC  0800 AC 
 
  SC   
 
Labetalol HCl 10 MG ONCE ONE  0200 CAN 
 
  IV  0201  
 
Labetalol HCl 10 MG ONCE ONE  2145 DC 
 
  IV 2146  214
 
Labetalol HCl 0 .STK-MED ONE 2104 DC 
 
  IV   
 
Labetalol HCl 10 MG ONCE ONE  2100 DC 
 
  IV  210  2102
 
Lisinopril 20 MG DAILY  1000 AC 
 
  PO   
 
Metoprolol Tartrate 50 MG BID  1000 AC 
 
  PO   
 
 
 
 
Last 24 Hrs of Lab/Chon Results
Last 24 Hrs of Labs/Mics:
 Laboratory Tests
 
18 0612:
Sodium Pending, Potassium Pending, Chloride Pending, Carbon Dioxide Pending, 
Anion Gap Pending, BUN Pending, Creatinine Pending, BUN/Creatinine Ratio Pending
, CBC w Diff Pending, WBC Pending, RBC Pending, Hgb Pending, Hct Pending, MCV 
Pending, MCH Pending, MCHC Pending, RDW Pending, Plt Count Pending, MPV Pending
 
18 0140:
Hemoglobin A1c Pending, Troponin I 0.02, Triglycerides 171  H, Cholesterol 204  
H, LDL Cholesterol, Calc 123, HDL Cholesterol 47, Cholesterol/HDL Ratio 4, TSH 
Pending, Free T4 1.01
 
18:
Anion Gap 15, Estimated GFR > 60, BUN/Creatinine Ratio 15.7, Glucose 122  H, 
Calcium 9.5, Total Bilirubin 0.8, AST 50,   H, Alkaline Phosphatase 70, 
Troponin I 0.03, Total Protein 7.6, Albumin 4.6, Globulin 3.0, Albumin/Globulin 
Ratio 1.5, CBC w Diff NO MAN DIFF REQ, RBC 5.40, MCV 84.5, MCH 28.3, MCHC 33.5, 
RDW 13.5, MPV 9.2, Gran % 69.7, Lymphocytes % 18.2  L, Monocytes % 10.8  H, 
Eosinophils % 1.0, Basophils % 0.3, Absolute Granulocytes 8.6  H, Absolute 
Lymphocytes 2.2, Absolute Monocytes 1.3  H, Absolute Eosinophils 0.1, Absolute 
Basophils 0
 
 
Assessment/Plan
Assessment:
 
Mr Hernandez is a 52 year old man w/ PMHx of hypertension, type 2 diabetes, 
hyperlipidemia, who has been noncompliant with his medication for the last 6 
months or so, came to the emergency room with a chief concern of  right-sided 
numbness and weakness RUE+RLE, blurring of vision, and imbalance while walking x
1 day. 
 
He was known to be in his usual state of health until around 6 AM on 3/19/2018, 
when he noticed right-sided numbness and weakness.  During the day at work, he 
noticed that he had blurry vision in both eyes.  After he returned home in the 
afternoon, he developed slurring of speech, imbalance while walking-dragging his
right foot after which he sought medical attention.  He has a history of 
noncompliance to his me dications.  He did not have any loss of consciousness, 
bladder bowel incontinence, , facial weakness or paralysis, memory deficits.  No
recent similar episodes were noted.  Did not have any fever, or any recent 
illness.  No chest pain, palpitations were noted.
 
At the time of admission, vitals-temperature 97.0, pulse rate 73, respiration 20
, blood pressure 2 1 4/107 (which improved to 191/88, after he was given 
labetalol and enalaprilat in the emergency room), pulse ox 97% on room air.  EKG
revealed T-wave flattening in V5 and V6( no previos EKG changes).  No ST-T wave 
elevation was noted.  Last echocardiogram that was done in 2017 revealed 
ejection fraction above 65%.
 
 
 
Pertinent lab findings:
 
WBC 12.3, hemoglobin 15.3, platelets 269
Sodium 138, potassium 3.5, BUN 11, serum creatinine 0.7
AST 50, , alkaline phosphatase 70
Troponin I-0.03, 0.02, pending
Triglycerides 171, cholesterol 204, , HDL 47, TSH-pending, free T4 1.01
 
 
CT scan head 2018 noncontrast revealed areas of decreased attenuation on 
the left here. Adjacent etiology in this case is most likely due to ischemic 
CVAto the body of left lateral ventricle and some decreased attenuation in the 
region of the basal ganglia/posterior limb of the internal capsule on the left. 
Also head of the caudate. Importantly there is no hemorrhage here. No midline 
shift or definitive mass effect. Basal cisterns appear patent. Posterior fossa 
risk grossly within normal limits. No extra-axial collection.
 
 
Etiology in this case with neurological deficits on right side of the body is 
most likely due to ischemic CVA on the internal capsule/MCA territory of left.  
The findings are consistent with ischemic changes in the internal capsule 
posterior limb.  Etiology could likely be due to uncontrolled hypertension, 
hyperlipidemia.  Considering the timing, he has been out of TPA window, but he 
received antiplatelet medications and which is a standard of care. 
 
Problem list:
#1 right-sided ischemic CVA, acute versus subacute
#2 hypertensive emergency
#3 type 2 diabetes
#4 hyperlipidemia
 
Plan:
 
#1 monitor the patient on telemetry
#2 neuro checks every 2
#3 monitor blood pressure closely, with permissive hypertension in the first 24 
hours
#4 start oral antihypertensives, metoprolol and lisinopril at his home dose.  
Hold for low blood pressure.
#4 insulin sliding scale, check HbA1c, Accu-Cheks
#5 administer high intensity statin
#6 Consider obtaining an MRI to delineate acute versus subacute findings
#7 follow-up ultrasound Dopplers-carotid, echocardiogram
#8 swallow evaluation, physical therapy consultation
#9 daily dose aspirin
 
 
Housekeeping:
#1 DVT prophylaxis-Lovenox subcutaneous
#2  Diet-heart healthy diet
#3 CODE STATUS-full code
 
Consults: Cardiology, Dr. White; neurology.
 
 
 
 
 
Problem List:
 1. CVA (cerebral vascular accident)
 
 2. Hypertensive urgency
 
Pain Ratin
Pain Location:
Back
Pain Goal: Pain 4 or less
Pain Plan:
Tylenol as needed
Tomorrow's Labs & Rationales:
CBC
Basic electrolyte panel
DVT/Prophylaxis: pharmacological
 
 
Adeline Rodriguez MD 18 1115:
Attending MD Review Statement
 
Attending Statement
Attending MD Statement: examined this patient, discuss w/resident/PA/NP, agreed 
w/resident/PA/NP, reviewed EMR data (avail), discussed with nursing, discussed 
with case mgmt, amended to note
Attending Assessment/Plan:
Patient seen and examined.  Resting comfortably not in any acute distress.  No 
issues overnight reported by nursing staff.  No events on telemetry monitoring 
overnight.  He remains in normal sinus rhythm.  Significant findings on 
neurologic exam include mild right facial droop which is more prominent when 
patient is asked to smile.  He is able to wrinkle forehead bilaterally.  He is 
able to keep his eyes closed tightly.  Also significant for his physical exam is
very mild weakness in the right lower extremity.  His power is 5/5 in all 
extremities however the right lower extremity does appear slightly weaker.  
Patient reports abnormal sensation in his right upper and lower extremity.  
Reports sensation of weakness in his right lower extremity.  Sensation is intact
globally.  His gait is normal.
 
Problems:
1.  Possible left hemispheric stroke; patient has persistent symptoms on the 
right side.
2.  Hypertensive emergency on presentation; secondary to poor compliance.
3.  Non-insulin-dependent diabetes mellitus; poorly compliant with therapy.
 
Plan:
-Continue antiplatelet therapy with aspirin.  High-dose statin therapy.
-His hemoglobin A1c is elevated at 7.1.  Provide sliding scale coverage while 
hospitalized.  Resume metformin upon discharge.
-Obtain MRI of the brain when available tomorrow to confirm diagnosis of stroke 
Rule out other intracranial process.  Head CT was not confirmatory and patient 
has persistent neurologic symptoms.
-Physical therapy and Occupational Therapy consultation.
-Awaiting neurology consultation.
-Blood pressure has imprved compared to presentation but is still elevated. 
Resume his home regimen and monitor blood pressure closely.
-Patient has elevated ALT. It was similarly elevated exactly a year ago.  Viral 
hepatitis panel was negative at that time.  Query secondary to fatty liver 
disease.  Obtain right upper quadrant sonogram.

## 2018-03-20 NOTE — DISCHARGE SUMMARY
Visit Information
 
Visit Dates
Admission Date:
03/19/18
 
Discharge Date:
03/20/18
 
 
Hospital Course
 
Course
Attending Physician:
Adeline Rodriguez MD
 
Primary Care Physician:
Neo Anthony DO
 
Hospital Course:
 
Mr Hernandez is a 52 year old man w/ PMHx of hypertension, type 2 diabetes, 
hyperlipidemia, who was noncompliant with his medications for the last 6 months 
or so, came to the emergency room with a chief concern of  right-sided numbness 
and weakness RUE+RLE w/ RLE>RUE weakness, blurring of vision, and imbalance 
while walking x 1 day secondary to ischemic stroke.  On examination, he had 
asymmetry of the face, strength 4/5 on right upper and lower extremity, reflexes
3+ on right side, plantars were downgoing, while the sensations were intact.  
Cerebellar signs were negative.
 
At the time of admission, vitals-temperature 97.0, pulse rate 73, respiration 20
, blood pressure  214/107 (which improved to 191/88, after he was given 
labetalol and enalaprilat in the emergency room), pulse ox 97% on room air.  EKG
revealed T-wave flattening in V5 and V6( no previos EKG changes).  No ST-T wave 
elevations noted. 
 
 
Pertinent lab findings:
 
 
 
WBC 12.3-->8.9, hemoglobin 15.3, platelets 269
 
Sodium 138, potassium 3.5, BUN 11, serum creatinine 0.7
 
AST 50, , alkaline phosphatase 70
 
Troponin I-0.03, 0.02, 0.01
 
Triglycerides 171, cholesterol 204, , HDL 47, TSH-1.168, free T4 1.01
 
 
 
 
CT scan head 03/19/2018 revealed areas of decreased attenuation on the left 
here. Adjacent etiology in this case is most likely due to ischemic CVAto the 
body of left lateral ventricle and some decreased attenuation in the region of 
the basal ganglia/posterior limb of the internal capsule on the left. Also head 
of the caudate. Importantly there is no hemorrhage here. No midline shift or 
definitive mass effect. Basal cisterns appear patent. Posterior fossa risk 
grossly within normal limits. No extra-axial collection.
 
MRI head done on 3/21/2018 revealed foci of restricted diffusion involving the 
left putamen and left caudate tail. These findings are consistent with acute 
ischemia. Chronic lacunar infarcts are visualized within both thalami and the 
left caudate nucleus. There is also a small focus of encephalomalacia and 
gliosis involving the right occipital lobe consistent with chronic changes of an
old right posterior cerebral artery territory infarct. Scattered chronic small 
vessel ischemic changes also visualized within the periventricular white matter.
No pathological magnetic susceptibility artifact. Intracranial vascular flow 
voids are grossly maintained.
 
Etiology in this case with neurological deficits on right side of the body was 
likely due to ischemic CVA on the internal capsule putamen and left caudate 
tail.  The findings are consistent with ischemic changes in the internal capsule
posterior limb likely due to pure motor symptoms, which was confirmed by the 
MRI.  Etiology was thought to be due to uncontrolled hypertension, 
hyperlipidemia.  Considering the timing of onset of symptoms he was out of TPA 
window, but he received antiplatelet medications/statins and which is a standard
of care. 
 
Problem list:
#1 right-sided ischemic CVA
#2 hypertensive emergency
#3 type 2 diabetes
#4 hyperlipidemia
 
Plan:
 
He was monitored on telemetry floor for the management of ischemic CVA and 
hypertensive emergency.  Neuro checks were done every 4, and blood pressure was 
monitored closely.  While he was on telemetry, he had a 3.6 second pause after 
which a decision was made to  cautiously use beta-blockers in his case.  
Antiplatelet medications, and statin was administered.  During the stay on the 
telemetry floor, symptoms gradually improved. Aggressive physical therapy was 
done.
 
For the management of CVA, MRI was done confirming the clinical findings of pure
motor stroke with multiple lacunar infarcts, subcortical infarcts in the MCA 
territory. Neurologist was consulted who recommended aggressive medical therapy.
Provided stroke education materials, and reinforced medication adherence for 
secondary stroke prevention. Swallow evaluation was done; and advanced diet as 
tolerated.  Since he made good progress, while he was in the hospital, physical 
therapy recommended outpatient physical therapy as well as Occupational Therapy.
 He did not have any swallowing difficulty.
 
In regards to his uncontrolled hypertension, he was started on labetalol 100 mg 
twice daily, nifedipine ER 30 mg, hydrochlorothiazide 12.5 mg once daily, and 
lisinopril 40 mg daily.  Blood pressure was monitored closely while he was in 
the telemetry unit.  Echocardiogram was done which revealed small left 
ventricular cavity with severe concentric left ventricular hypertrophy.  He was 
advised to adhere to his medications.  Also advised him to space out medications
during the day to avoid hypotension.  Ultrasound Doppler of the pelvis was done 
that did not show any evidence of renal artery stenosis.  Encouraged him to get 
home blood pressure monitor, and discussed with the primary care physician in 
the next 1 week to titrate his medications.  Also advised him to adhere to 
healthy lifestyle, which would eventually improve the blood pressure.  His 
hemoglobin A1c was 7.7, and was continued on metformin 500 mg twice daily which 
was his previous dose.  This also needs to be followed up in the next few months
after he takes his medications regularly. 
 
There was a drop in H&H, which was attributed to dilution.  Repeat H&H remained 
stable.  He did not have any symptoms at that time. 
 
 
Consults: Cardiology, Dr. White; neurology-Dr. Hairston.
 
Allergies:
Coded Allergies:
No Known Allergies (03/19/18)
 
Pertinent Lab Results:
 
 
CAT - CT HEAD WO IV CONTRAST  03/19/
 
FINDINGS: There are areas of decreased attenuation on the left here. Adjacent
to the body of left lateral ventricle and some decreased attenuation in the
region of the basal ganglia/posterior limb of the internal capsule on the
left. Also head of the caudate. Importantly there is no hemorrhage here. No
midline shift or definitive mass effect. Basal cisterns appear patent.
Posterior fossa risk grossly within normal limits. No extra-axial collection.
 
Decreased attenuation right occipital region. This may represent an area of
old infarction. Again no mass effect.
 
IMPRESSION: Scattered areas of decreased attenuation here are noted. Most
left-sided. Some these may represent old infarcts but others are more
indeterminate and may be acute or subacute in nature. Given the history MR
including diffusion-weighted imaging would be recommended to further
evaluate. Importantly there is no hemorrhage or midline shift or mass effect
here.
 
 
-------------------------------------------------------------------------------
 
US - [id number] DOPPLER 03/20/18
 
No hemodynamically significant narrowing is noted throughout either left or
right carotid systems. As noted, antegrade flow is demonstrated within both
left and right vertebral arteries.
 
 
 
--------------------------------------------------------------------------------
 
 
 ECHOCARDIOGRAM  03/20/18
 
 
 Small left ventricular cavity. 
 Severe concentric left ventricular hypertrophy. 
 Normal left ventricular ejection fraction visually estimated at > 
 65%. 
 Abnormal relaxation filling pattern of the left ventricle for age  
 (stage 1 diastolic dysfunction). 
 Mildly elevated resting left ventricular outflow tract velocity (1.6  
 m,/s). 
 Normal right ventricular size and function. 
 Normal right atrial size. 
 Mildly dilated left atrium. 
 Trace mitral regurgitation. 
 Trace tricuspid regurgitation. 
 No evidence of pulmonary hypertension. 
 Mildly dilated proximal ascending aorta (tube). 
 
 
 
--------------------------------------------------------------------------------
 
MRI - MRI-HEAD W/O GUNJAN  03/21/18
 
There are foci of restricted diffusion involving the left putamen and left
caudate tail. These findings are consistent with acute ischemia. Chronic
lacunar infarcts are visualized within both thalami and the left caudate
nucleus. There is also a small focus of encephalomalacia and gliosis
involving the right occipital lobe consistent with chronic changes of an old
right posterior cerebral artery territory infarct. Scattered chronic small
vessel ischemic changes also visualized within the periventricular white
matter. No pathological magnetic susceptibility artifact. Intracranial
vascular flow voids are grossly maintained.
 
There is no intracranial mass effect or midline shift. No abnormal
extra-axial collection. Lateral and third ventricles are proportionate to the
subarachnoid spaces. No hydrocephalus. Midline structures including the
cervicomedullary junction are normal. Bone marrow signal intensity is normal.
There is no mastoid or middle ear effusion. Visualized paranasal sinuses are
well-aerated with exception of mild disease within ethmoid air cells. Globes
and orbits are symmetric.
 
IMPRESSION:
There are a few small acute striatocapsular infarcts. These acute findings
are superimposed upon numerous chronic ischemic changes as described above.
No evidence of acute hemorrhage. No mass effect, midline shift, or
hydrocephalus.
 
 
--------------------------------------------------------------------------------
----
 
Renal ultrasound  03/22/18-
 
The abdominal aorta shows mild atherosclerotic disease. The pararenal
abdominal aorta shows a peak systolic velocity of 99 cm/s.
The peak systolic velocities within the proximal mid, distal renal arteries
are as follows (centimeters per second): (Some considered increased peak
systolic velocity of 180 cm/s as abnormal)
 
Right renal artery: Proximal: 141; mid: 80; distal 120.
 
Left renal artery: Proximal 104; mid 103; distal 121.
 
Renal aortic ratio (RAR): 1.4 on the right and 1.2 on the left (Greater than
3-3.5 is usually considered abnormal)
 
 
RIGHT KIDNEY: 11.7 x 6.1 x 4.7 cm (SAG x AP x TRV). The kidney is normal in
size, contour, and echogenicity. Renal cortical thickness is normal. No focal
parenchymal lesions. No hydronephrosis. 0.6 cm echogenic focus is noted at
the inferior pole without any acoustic shadowing, may represent prominent fat
versus subtle nonobstructing calculus.
 
LEFT KIDNEY: 11.8 x 4.9 x 4.6 cm (SAG x AP x TRV). The kidney is normal in
size, contour, and echogenicity. Renal cortical thickness is normal. No
calculi or focal parenchymal lesions. No hydronephrosis.
 
BLADDER: Well-distended and normal. Bilateral ureteral jets are demonstrated.
Prevoid volume measures 215 mL. No postvoid residual.
 
The prostate is visualized, measures 4.2 x 3.1 x 2.2 cm.
 
IMPRESSION:
 
1. No sonographic evidence of renal artery stenosis..
2. 0.6 cm echogenic focus is noted at the inferior pole of the right kidney,
may represent prominent fat versus subtle nonobstructing calculus.
 
 
Disposition Summary
 
Disposition
Principal Diagnosis:
Ischemic CVA
Additional Diagnosis:
Hypertensive emergency
Discharge Disposition: home or self care
 
Discharge Instructions
 
General Discharge Information
Code Status: Full Code
Patient's Diet:
Heart healthy diet
Patient's Activity:
As tolerated
Follow-Up Instructions/Appts:
1.  Please follow-up with your primary care provider within the week of 
discharge.  Please discuss with them about recent medication changes that were 
made while you are in the hospital.  Please also follow-up with him about your 
blood pressure readings, and titrate the blood pressure medication doses 
accordingly.
 
2.  Please follow-up with your neurologist within a week of discharge.  If you 
have any worsening symptoms, please seek medical attention immediately.
 
3.  Please take your medications as prescribed. 
 
Medications at Discharge
Discharge Medications:
Stop taking the following medications:
Metoprolol Tartrate (Metoprolol Tartrate) 50 MG TABLET ORAL TWICE DAILY 
 
Continue taking these medications:
Rosuvastatin Calcium (Crestor) 40 MG TABLET
    1 Tablet ORAL DAILY
    Comments:
       NOT GIVEN IN THE HOSPITAL
 
Metformin HCl (Metformin HCl) 500 MG TABLET
    1 Tablet ORAL TWICE DAILY
    Comments:
       NOT GIVEN IN THE HOSPITAL
 
Start taking the following new medications:
Hydrochlorothiazide (Hydrochlorothiazide) 12.5 MG CAPSULE
    12.5 Milligram ORAL DAILY
    Qty = 90
    Refills = 1
    Instructions:
       .
    Comments:
       Last Taken: 03/22/18
             Time: 13:02
 
Aspirin (Aspirin*) 81 MG TAB.CHEW
    81 Milligram ORAL DAILY
    Qty = 90
    No Refills
    Instructions:
       .
    Comments:
       Last Taken: 03/22/18
             Time: 08:50
 
Lisinopril (Lisinopril) 40 MG TABLET
    1 Tablet ORAL DAILY
    Qty = 90
    Refills = 1
    Instructions:
       .
    Comments:
       Last Taken: 03/22/18
             Time: 08:51
 
Nifedipine (Nifedipine ER) 30 MG TAB.ER.24
    30 Milligram ORAL DAILY
    Qty = 90
    Refills = 1
    Instructions:
       .
    Comments:
       Last Taken: 03/22/18
             Time: 13:02
 
Labetalol HCl (Labetalol HCl) 100 MG TABLET
    100 Milligram ORAL TWICE DAILY
    Qty = 90
    Refills = 1
    Instructions:
       .
    Comments:
       Last Taken: 03/22/18
             Time: 08:51
 
 
Copies To:
Neo Anthony DO, MD Review Statement
Documenting Attending:
Adeline Rodriguez MD
Other Findings:
Discharged in stable condition

## 2018-03-20 NOTE — EVENT NOTE
Event Note
Event Note:
 
It was noted that Mr Hernandez had a 3.6 second pause on the tele monitor. He was 
completely asymtomatic at that time. On examination, he was comfortable. No CP, 
palpitations or shortenss of breath. Considering a longer pause than 2 sec, the 
dose of labetolol was decreased to 100 mg BID with holding parameters. Discussed
w/ the cardiologist and informed the covering team to monitor closely for any 
such events and notify the cardiologist if significant pauses are seen.

## 2018-03-20 NOTE — ADMISSION CERTIFICATION
Admission Certification
 
Certification Statement
- As attending physician, I certify that at the time of
- admission, based on clinical presentation, severity of
- symptoms, need for further diagnostic testing and
- therapeutic interventions, and risk of adverse outcomes
- without in-hospital treatment, in my clinical assessment,
- this patient requires an acute hospital stay for a minimum
- of two nights or longer. I have also considered psychsocial
- factors such as support system, advanced age, financial
- issues, cognitive issues, and failed out-patient treatments,
- past re-admission history, safety of patient, and lack of
- compliance as applicable.
Specific rationale supporting this admission is:
Acute ischemic stroke

## 2018-03-20 NOTE — CONS- NEUROLOGY
General Information and HPI
 
Consulting Request
Date of Consult: 03/20/18
Requested By:
Adeline Rodriguez MD
 
History of Present Illness:
52-year-old male with known hypertension, dyslipidemia and prediabetes or 
diabetes, noncompliant with medications, was in his usual state of health until 
2 nights ago when he began to feel somewhat dizzy.  Yesterday at work he 
developed heightened dizziness and unsteadiness.  Thereafter was noted to be 
slurring his speech and having difficulty walking.  He ultimately reported to 
the [facility] emergency room where he was admitted for further evaluation
and treatment.
 
There has been no recent trauma.  He denies prior history of TIA or CVA.
 
CAT scan of the brain on admission showed areas of hypoattenuation in the left 
subcortical region as well as a questionable area in the right occipital region.
 These suggest strokes of indeterminate age.
 
Carotid ultrasound showed no hemodynamically significant stenoses
 
Allergies/Medications
Allergies:
Coded Allergies:
No Known Allergies (03/19/18)
 
Home Med List:
Lisinopril 20 MG TABLET   1 TAB PO DAILY htn  (Reported)
Metformin HCl 500 MG TABLET   1 TAB PO BID diabetes  (Reported)
Metoprolol Tartrate 50 MG TABLET   1 TAB PO BID hypertension  (Reported)
Rosuvastatin Calcium (Crestor) 40 MG TABLET   1 TAB PO DAILY hlp  (Reported)
 
 
Review of Systems
Review of Systems:
Endorses slurred speech, right-sided clumsiness, difficulty walking and 
blurriness of vision.  There is been no recent fever, chills, rash, head trauma 
him a diplopia, chest pain, shortness breath, vomiting, vertigo or bleeding 
disturbance
 
Past History
 
Travel History
Traveled to [place] past 21 day No
 
Medical History
Blood Transfusion Hx: No
Neurological: NONE
EENT: NONE
Cardiovascular: hypertension, hyperlipidemia
Respiratory: NONE
Gastrointestinal: NONE
Hepatic: NONE
Renal: NONE
Musculoskeletal: NONE
Psychiatric: NONE
Endocrine: diabetes
Blood Disorders: NONE
Cancer(s): NONE
GYN/Reproductive: NONE
 
Surgical History
Surgical History: non-contributory
 
Psychosocial History
Where Do You Live? Home
Who Do You Live With? spouse
Services at Home: None
Primary Language: English
Smoking Status: Former Smoker
ETOH Use: 2 beers per day
Illicit Drug Use: denies illicit drug use
 
Functional Ability
ADLs
Independent: dressing, eating, toileting, bathing. 
Ambulation: independent
IADLs
Independent: shopping, housework, finances, food prep, telephone, transportation
, medication admin. 
 
Exam & Diagnostic Data
Vital Signs and I&O
Vital Signs
 
 
Date Time Temp Pulse Resp B/P B/P Pulse O2 O2 Flow FiO2
 
     Mean Ox Delivery Rate 
 
03/20 1226    166/98     
 
03/20 1027  67  177/92     
 
03/20 0940 99.0 67 19 177/92  96 Room Air  
 
03/20 0600 98.2 68 20 148/92  97 Room Air  
 
03/20 0214  62  178/102     
 
03/20 0126 98.2 69 20 190/100  94 Room Air  
 
03/19 2346 97.3 62 18 185/95  97 Room Air  
 
03/19 2229 97.2 65 20 191/88  97 Room Air  
 
03/19 2211  70 20 212/99     
 
03/19 2147  72 20 204/104     
 
03/19 2138 97.0 72 20 204/104  97 Room Air  
 
03/19 2114    180/90     
 
03/19 2102  72 20 200/100     
 
03/19 2054  72 20 200/100  98 Room Air  
 
03/19 2053 97.0 73 20 214/107  97 Room Air  
 
03/19 2023 98.1 75 18   95 Room Air  
 
 
 Intake & Output
 
 
 03/20 1600 03/20 0800 03/20 0000
 
Intake Total  110 
 
Output Total  500 
 
Balance  -390 
 
    
 
Intake, IV  10 
 
Intake, Oral  100 
 
Output, Urine  500 
 
Patient  209 lb 230 lb
 
Weight   
 
Weight  Bed scale Reported by Patient
 
Measurement   
 
Method   
 
 
Pleasant middle-aged moderately overweight male in no acute distress.  He was 
awake, alert and cooperative.  Head was normocephalic and atraumatic.  Higher 
cortical function was intact.  Speech was fluent.  Pupils were equal and 
reactive.  Extraocular movements were full.  There was no nystagmus.  There was 
no field cut.  He had a right central facial paresis.  There was a mild pronator
drift of the right upper extremity.  He had a mild right-sided reflex 
preponderance.  Babinski sign was present on the right.  The left plantar was 
flexor.  The sensory examination was normal to light touch.  Fine finger 
movements and rapid alternating movements were were impaired in the right hand. 
His gait was unsteady.  He held onto the wall to assist himself out of the 
bathroom.
 
Assessment/Plan
Assessment:
Mr. Hernandez presents with symptoms and signs of an acute left subcortical infarct. 
This occurs in the setting of untreated vascular risk factors, most prominently 
hypertension.
Recommendations:
The patient will need the following:
 
#1 gentle blood pressure control and glycemic control
 
#2 aspirin 81 mg per day
 
#3 a high intensity statin
 
#4 physical, occupational and speech therapy
 
#5 a full lipid panel if not already done
 
Office follow-up in 2-3 weeks
 
Please feel free to call with any further questions.
 
 
Consult Acknowledgment
- Thank you for your consult request.

## 2018-03-21 VITALS — SYSTOLIC BLOOD PRESSURE: 170 MMHG | DIASTOLIC BLOOD PRESSURE: 80 MMHG

## 2018-03-21 VITALS — SYSTOLIC BLOOD PRESSURE: 163 MMHG | DIASTOLIC BLOOD PRESSURE: 95 MMHG

## 2018-03-21 VITALS — SYSTOLIC BLOOD PRESSURE: 190 MMHG | DIASTOLIC BLOOD PRESSURE: 100 MMHG

## 2018-03-21 VITALS — DIASTOLIC BLOOD PRESSURE: 82 MMHG | SYSTOLIC BLOOD PRESSURE: 152 MMHG

## 2018-03-21 VITALS — DIASTOLIC BLOOD PRESSURE: 102 MMHG | SYSTOLIC BLOOD PRESSURE: 192 MMHG

## 2018-03-21 NOTE — PN- HOUSESTAFF
Andie Moore 18 0710:
Subjective
Follow-up For:
- CVA 
Complaints: no complaints
Tele-Events Since Last Visit:
NSR, Rosalio 47-67; reported a 3.6 second pause yesterday afternoon with no 
symptoms. 
Subjective:
 
Feels better. No new complaints. He was afebrile overnight, and BP remained 
slightly higher overnight; received labetolol 100mg as per the cardiologists 
recs. No new neurological symptoms. His father was at the bedside at the time of
examination. No CP/palpitations/dyspnea. No dizziness, or changes in vision. 
 
Review of Systems
Constitutional:
Reports: see HPI. 
 
Objective
Last 24 Hrs of Vital Signs/I&O
 Vital Signs
 
 
Date Time Temp Pulse Resp B/P B/P Pulse O2 O2 Flow FiO2
 
     Mean Ox Delivery Rate 
 
7 98.9 65 16 184/100  95 Room Air  
 
 2138  67  184/100     
 
 1713 98.0 70 18 162/98  95 Room Air  
 
 1226    166/98     
 
 1027  67  177/92     
 
 0940 99.0 67 19 177/92  96 Room Air  
 
 
 Intake & Output
 
 
  0800  0000  1600
 
Intake Total 50 240 
 
Output Total   400
 
Balance 50 240 -400
 
    
 
Intake, Oral 50 240 
 
Number 0  
 
Bowel   
 
Movements   
 
Output, Urine   400
 
Patient  205 lb 
 
Weight   
 
Weight  Bed scale 
 
Measurement   
 
Method   
 
 
 
 
Physical Exam
General Appearance: No Acute Distress
Other Physical Findings:
Alert, Oriented X3, Cooperative, No Acute Distress
Skin: No Rashes, No Breakdown
Skin Temp/Moisture Exam: Warm/Dry
Sepsis Skin Exam (color): Normal for Ethnicity
HEENT: Atraumatic, PERRLA, EOMI
Neck: Supple, No JVD
Lymphatic: Cervical nl
Cardiovascular: Regular Rate, Normal S1, Normal S2, No Murmurs
Lungs: Normal Air Movement
Abdomen: Normal Bowel Sounds, Soft, No Tenderness
Neurological: Normal Speech, Cranial Nerves 3-12 NL, Reflexes 2+, left sided 
facial asymmetry right sided weakness- strength 4/5 on RUE, RLE sensations 
decreased in RUE, RLE plantars down going finger nose test intact. gait not 
tested, reflexes right knee reflex 3+
Extremities: No Cyanosis, No Edema
Vascular: Pulses Symmetrical
Current Medications:
 Current Medications
 
 
  Sig/Homero Start time  Last
 
Medication Dose Route Stop Time Status Admin
 
Acetaminophen 650 MG Q6P PRN  0030 AC 
 
  PO   
 
Aspirin 81 MG DAILY  1000 AC 
 
  PO   1026
 
Atorvastatin Calcium 80 MG 1700  1700 AC 
 
  PO   1707
 
Enoxaparin Sodium 40 MG DAILY  1000 AC 
 
  SC   1027
 
Hydrochlorothiazide 12.5 MG DAILY  1000 AC 
 
  PO   
 
Insulin Aspart 0 TIDAC  0800 AC 
 
  SC   1317
 
Labetalol HCl 200 MG BID  2200 DC 
 
  PO   
 
Labetalol HCl 100 MG BID  2200 AC 
 
  PO   2138
 
Lisinopril 20 MG DAILY  1000 CAN 
 
  PO   
 
Lisinopril 20 MG DAILY  1000 AC 
 
  PO   1027
 
Metoprolol Tartrate 50 MG BID  1000 CAN 
 
  PO   
 
Patient Medication  1 ED ONE ONE  1700 DC 
 
Teaching  ED  1701  
 
Potassium Chloride 40 MEQ ONCE ONE  0900 DC 
 
  PO  0901  1027
 
 
 
 
Last 24 Hrs of Lab/Chon Results
Last 24 Hrs of Labs/Mics:
 Laboratory Tests
 
18 0600:
Sodium Pending, Potassium Pending, Chloride Pending, Carbon Dioxide Pending, 
Anion Gap Pending, BUN Pending, Creatinine Pending, BUN/Creatinine Ratio Pending
 
18 0829:
Troponin I < 0.01
 
 
Assessment/Plan
Assessment:
Mr Hernandez is a 52 year old man w/ PMHx of hypertension, type 2 diabetes, 
hyperlipidemia, who has been noncompliant with his medication for the last 6 
months or so, came to the emergency room with a chief concern of  right-sided 
numbness and weakness RUE+RLE, blurring of vision, and imbalance while walking x
1 day. 
 
 
Pertinent lab findings:
 
WBC 12.3-->8.9, hemoglobin 15.3, platelets 269
potassium 3.4-->3.8, BUN 16, serum creatinine 0.9
Troponin I-0.03, 0.02, 0.01
Triglycerides 171, cholesterol 204, , HDL 47, TSH-pending, free T4 1.01
 
 
Echocardiogram: 
 
Small left ventricular cavity. 
 Severe concentric left ventricular hypertrophy. 
 Normal left ventricular ejection fraction visually estimated at > 
 65%. 
 Abnormal relaxation filling pattern of the left ventricle for age  
 (stage 1 diastolic dysfunction). 
 Mildly elevated resting left ventricular outflow tract velocity (1.6  
 m,/s). 
 Normal right ventricular size and function. 
 Normal right atrial size. 
 Mildly dilated left atrium. 
 Trace mitral regurgitation. 
 Trace tricuspid regurgitation. 
 No evidence of pulmonary hypertension. 
 Mildly dilated proximal ascending aorta (tube). 
 
Carotid dopplers: 
 
No hemodynamically significant narrowing is noted throughout either left or
right carotid systems. As noted, antegrade flow is demonstrated within both
left and right vertebral arteries.
 
 
 
Etiology in this case with neurological deficits on right side of the body is 
most likely due to ischemic CVA on the internal capsule/MCA territory of left.  
The findings are consistent with ischemic changes in the internal capsule 
posterior limb.  Etiology could likely be due to uncontrolled hypertension, 
hyperlipidemia.  Considering the timing, he has been out of TPA window, but he 
received antiplatelet medications and which is a standard of care. 
 
Problem list:
#1 right-sided ischemic CVA, acute versus subacute
#2 hypertensive emergency
#3 type 2 diabetes
#4 hyperlipidemia
#4 Severe LVH
#5 HFpEF ( stage I diastolic dysfunction )
 
 
Plan:
 
#1 monitor the patient on telemetry
#2 neuro checks every 4
#3 monitor blood pressure closely, with permissive hypertension in the first 24 
hours
#4 Continue oral antihypertensives, Labetol 100mg BID and lisinopril 20mg, HCTZ 
12.5 at his home dose. If the BP is inadequately controlled, increase the dose 
of labetolol.
#4 insulin sliding scale, Accu-Cheks
#5 administer high intensity statin
#6 Follow up MRI w/o GUNJAN to delineate acute versus subacute findings
#7 swallow evaluation, physical therapy consultation
#8 daily dose aspirin
 
Housekeeping:
#1 DVT prophylaxis-Lovenox subcutaneous
#2  Diet-heart healthy diet
#3 CODE STATUS-full code
 
Consults: Cardiology, Dr. White; neurology.
Problem List:
 1. Hypertensive emergency
 
 2. CVA (cerebral vascular accident)
 
Pain Ratin
Pain Location:
back
Pain Goal: Pain 4 or less
Pain Plan:
tylenol prn
Tomorrow's Labs & Rationales:
no labs necessary
 
 
Adeline Rordiguez MD 18 1418:
Attending MD Review Statement
 
Attending Statement
Attending MD Statement: examined this patient, discuss w/resident/PA/NP, agreed 
w/resident/PA/NP, reviewed EMR data (avail), discussed with nursing, discussed 
with case mgmt, amended to note
Attending Assessment/Plan:
Patient seen and examined.  Resting comfortably not in any acute distress.  No 
issues overnight reported by the patient.  On telemetry monitoring overnight he 
was noted to be bradycardic in the upper 40s.  He did have a 3.6 second pause 
yesterday afternoon.  Denies chest pain or shortness of breath.  Denies 
palpitations.  Blood pressure remains elevated this morning.  On examination,   
Speech remains intact. Power is mildly reduced in the right upper and lower 
extremities.  His gait is unsteady.
 
Recommendations:
-Repeat CBCs in a.m. to ensure that his hemoglobin level is stable.  No need to 
repeat BP at this time.
-Continue antiplatelet therapy with aspirin.  Continue high-dose statin.  
Continue blood pressure regimen.
-Patient will benefit from acute rehabilitation center upon discharge.
-Follow-up with the cardiology service regarding sinus bradycardia and sinus 
pauses noted on telemetry monitoring overnight.
-Increase lisinopril to 40 mg daily.(He will need an additional 20 mg today.)  
Continue labetalol and hydrochlorothiazide.
-We will anticipate discharge once blood pressure has improved.

## 2018-03-21 NOTE — ECHOCARDIOGRAM REPORT
ANGLE ANN 
 
 Age:    52     :    1965      Gender:     M 
 
 MRN:    [id number] 
 
 Exam Date:     2018  
                16:28 
 
 Exam Location: 1  
 [place] 
 
 Ht (in):     66      Wt (lb):      230     BSA:    2.25 
 
 BP:          148     /     92 
 
 Ordering Physician:        Liam Irizarry MD 
 
 Referring Physician:       Yung White MD,  
                            PhD 
 
 Technologist:              Christine Rangel [facility] 
 
 Room Number:               177 
 
 Indications:       STROKE 
 
 Rhythm:                 Sinus 
 
 Technical Quality:      Fair 
 
 FINDINGS 
 
 Left Ventricle 
 Small left ventricular cavity. Severe concentric left ventricular  
 hypertrophy. No obvious regional wall motion abnormalities. Normal  
 left ventricular ejection fraction visually estimated at >65%.   
 Mildly elevated resting left ventricular outflow tract velocity (1.6  
 m,/s). Abnormal relaxation filling pattern of the left ventricle for  
 age (stage 1 diastolic dysfunction). 
 
 Right Ventricle 
 Normal right ventricular size and function. 
 
 Right Atrium 
 Normal right atrial size. 
 
 Left Atrium 
 Mildly dilated left atrium. 
 
 Mitral Valve 
 Mild mitral annular calcification. Mitral valve mildly thickened.   
 Trace mitral regurgitation. 
 
 Aortic Valve 
 Trileaflet aortic valve.  Mild aortic sclerosis.  No hemodynamically  
 significant aortic stenosis.  No aortic regurgitation. 
 
 Tricuspid Valve 
 Structurally normal tricuspid valve.  Trace tricuspid regurgitation.   
 No evidence of pulmonary hypertension. Right ventricular systolic  
 pressure estimated to be within the normal range at 22 mmHg. 
 
 Pulmonic Valve 
 Pulmonic valve not well visualized, grossly normal.  No pulmonic  
 regurgitation. 
 
 Pericardium 
 No pericardial effusion. 
 
 Great Vessels 
 Normal size aortic root. Mildly dilated proximal ascending aorta  
 (tube). 
 
 CONCLUSIONS 
 Small left ventricular cavity. 
 Severe concentric left ventricular hypertrophy. 
 Normal left ventricular ejection fraction visually estimated at > 
 65%. 
 Abnormal relaxation filling pattern of the left ventricle for age  
 (stage 1 diastolic dysfunction). 
 Mildly elevated resting left ventricular outflow tract velocity (1.6  
 m,/s). 
 Normal right ventricular size and function. 
 Normal right atrial size. 
 Mildly dilated left atrium. 
 Trace mitral regurgitation. 
 Trace tricuspid regurgitation. 
 No evidence of pulmonary hypertension. 
 Mildly dilated proximal ascending aorta (tube). 
 
 Jordan Miles M.D. 
 (Electronically Signed) 
 Final Date:      2018  
                  10:34 
 
 MEASUREMENTS  (Male / Female) Normal Values 
 
 2D ECHO 
 LV Diastolic Diameter PLAX        3.2 cm                4.2 - 5.9 / 3.9 - 5.3 
cm 
 LV Systolic Diameter PLAX         1.8 cm                2.1 - 4.0 cm 
 LV Fractional Shortening PLAX     43.8 %                25 - 46  % 
 LV Ejection Fraction 2D Teich     76.3 %                 
 IVS Diastolic Thickness           1.8 cm                 
 LVPW Diastolic Thickness          1.8 cm                 
 LV Relative Wall Thickness        1.1                    
 RV Internal Dim ED PLAX           2.7 cm                1.9 - 3.8 cm 
 LVOT Diameter                     2.0 cm                 
 Aortic Root Diameter              3.3 cm                 
 LA Systolic Diameter LX           4.1 cm                3.0 - 4.0 / 2.7 - 3.8 
cm 
 LA Volume                         44.0 cm              18 - 58 / 22 - 52 cm 
 Ascending Aorta Diameter          3.8 cm                 
 
 DOPPLER 
 AV Peak Velocity                  173.0 cm/s             
 AV Peak Gradient                  12.0 mmHg              
 AV Mean Velocity                  124.0 cm/s             
 AV Mean Gradient                  7.0 mmHg               
 AV Velocity Time Integral         33.4 cm                
 LVOT Peak Velocity                161.0 cm/s             
 LVOT Peak Gradient                10.4 mmHg              
 LVOT Mean Velocity                104.0 cm/s             
 LVOT Mean Gradient                5.0 mmHg               
 LVOT Velocity Time Integral       29.5 cm                
 LVOT Stroke Volume                92.7 cm               
 AV Area Cont Eq vti               2.8 cm                
 AV Area Cont Eq pk                2.9 cm                
 MV Peak Velocity                  111.0 cm/s             
 MV Peak Gradient                  4.9 mmHg               
 MV Mean Velocity                  53.1 cm/s              
 MV Mean Gradient                  1.0 mmHg               
 Mitral E Point Velocity           88.8 cm/s              
 Mitral A Point Velocity           95.8 cm/s              
 Mitral E to A Ratio               0.9                    
 MV PHT Velocity                   92.1 cm/s              
 MV Deceleration Butler             303.0 cm/s            
 MV Pressure Half Time             91.2 ms                
 MV Area PHT                       2.4 cm                
 MV Deceleration Time              285.0 ms               
 TR Peak Velocity                  203.0 cm/s             
 TR Peak Gradient                  16.5 mmHg              
 Right Atrial Pressure             5.0 mmHg               
 Pulmonary Artery Systolic Pressu  21.5 mmHg              
 Right Ventricular Systolic Press  21.5 mmHg              
 PV Peak Velocity                  152.0 cm/s             
 PV Peak Gradient                  9.2 mmHg               
 PV Mean Velocity                  100.0 cm/s             
 PV Mean Gradient                  5.0 mmHg               
 PV Velocity Time Integral         30.5 cm                
 LV E' Lateral Velocity            8.3 cm/s               
 Mitral E to LV E' Lateral Ratio   10.7                   
 LV E' Septal Velocity             4.8 cm/s               
 Mitral E to LV E' Septal Ratio    18.6

## 2018-03-21 NOTE — PN- CARDIOLOGY
Subjective
Subjective:
* Patient remains frustrated over weakness and lack of coordination of right 
side. No lightheadedness.
* Patient noted to have a greater than 3 second pause on Labetolol.
 
 
Objective
Vital Signs and I&Os
Vital Signs
 
 
Date Time Temp Pulse Resp B/P B/P Pulse O2 O2 Flow FiO2
 
     Mean Ox Delivery Rate 
 
03/21 1600       Room Air  
 
03/21 1436 97.9 66 18 163/95  97 Room Air  
 
03/21 1300    170/80     
 
03/21 1153  78  190/100     
 
03/21 1055  80  190/116     
 
03/21 0900  70  180/100     
 
03/21 0718 98.6 55 18 152/82  95 Room Air  
 
03/20 2237 98.9 65 16 184/100  95 Room Air  
 
03/20 2138  67  184/100     
 
 
 Intake & Output
 
 
 03/21 1600 03/21 0800 03/21 0000 03/20 1600 03/20 0800 03/20 0000
 
Intake Total 500 50 240  110 
 
Output Total    400 500 
 
Balance 500 50 240 -400 -390 
 
       
 
Intake, IV     10 
 
Intake, Oral 500 50 240  100 
 
Number  0    
 
Bowel      
 
Movements      
 
Output, Urine    400 500 
 
Patient 205 lb  205 lb  209 lb 230 lb
 
Weight      
 
Weight   Bed scale  Bed scale Reported by Patient
 
Measurement      
 
Method      
 
 
 
Physical Exam:
General: WD/WN female in NAD; alert and oriented x 3
HEENT: NC/AT, PERRL, EOMI
Neck: no JVD, no carotid bruit
Heart: RRR w/o murmur
Lungs: clear bilaterally
ABdomen: soft, NT, +ve bowel sounds
Extremties: no edema
 
Assessment/Plan
Assessment/Plan
* This patient has evidence of sick sinus syndrome with a significant sinus 
pause on Labetolol 200mg and lesser pauses on Labetolol at 100mg BID. He remains
severely hypertensive but has a heart rate in the 60's. Begin Nifedipine ER 30mg
daily and increase as needed for control of blood pressure. No change in other 
medications for now. We will continue to monitor for significant pauses or 
symptomatic bradycardia. No indication for permanent pacemaker at this point in 
time.
* In consideration of severe hypertension on multiple medications obtain a renal
ultrasound to assess for renal artery stenosis.
* Continue to monitor on telemetry.
Continue telemetry? Yes

## 2018-03-21 NOTE — MRI REPORT
EXAMINATION:
MR BRAIN WITHOUT CONTRAST
 
CLINICAL INFORMATION:
Right-sided weakness. Question ischemic stroke.
 
COMPARISON:
CT scan of the head 03/19/2018.
 
TECHNIQUE:
MRI of the brain without contrast was obtained using routine sequences.
 
FINDINGS:
There are foci of restricted diffusion involving the left putamen and left
caudate tail. These findings are consistent with acute ischemia. Chronic
lacunar infarcts are visualized within both thalami and the left caudate
nucleus. There is also a small focus of encephalomalacia and gliosis
involving the right occipital lobe consistent with chronic changes of an old
right posterior cerebral artery territory infarct. Scattered chronic small
vessel ischemic changes also visualized within the periventricular white
matter. No pathological magnetic susceptibility artifact. Intracranial
vascular flow voids are grossly maintained.
 
There is no intracranial mass effect or midline shift. No abnormal
extra-axial collection. Lateral and third ventricles are proportionate to the
subarachnoid spaces. No hydrocephalus. Midline structures including the
cervicomedullary junction are normal. Bone marrow signal intensity is normal.
There is no mastoid or middle ear effusion. Visualized paranasal sinuses are
well-aerated with exception of mild disease within ethmoid air cells. Globes
and orbits are symmetric.
 
IMPRESSION:
There are a few small acute striatocapsular infarcts. These acute findings
are superimposed upon numerous chronic ischemic changes as described above.
No evidence of acute hemorrhage. No mass effect, midline shift, or
hydrocephalus.

## 2018-03-21 NOTE — PN- NEUROLOGY
Subjective
Subjective:
Seen for initial consult by Dr Hogan 3-20:" symptoms and signs of an acute left 
subcortical infarct.  This occurs in the setting of untreated vascular risk 
factors, most prominently hypertension."
He was placed on aspirin and high intensity statin.  Blood pressure remains 
elevated.
He was seen by the physical therapist, who recommends acute rehabilitation at 
the [facility] unit.
Review of Systems:
Currently denies headache or dizziness.  States he is swallowing without 
difficulty.  Denies chest pain palpitations or shortness of breath.
States he works as a .
 
Objective
Vital Signs and I&Os
Vital Signs
 
 
Date Time Temp Pulse Resp B/P B/P Pulse O2 O2 Flow FiO2
 
     Mean Ox Delivery Rate 
 
03/21 0900  70  180/100     
 
03/21 0718 98.6 55 18 152/82  95 Room Air  
 
03/20 2237 98.9 65 16 184/100  95 Room Air  
 
03/20 2138  67  184/100     
 
03/20 1713 98.0 70 18 162/98  95 Room Air  
 
03/20 1226    166/98     
 
03/20 1027  67  177/92     
 
 
 Intake & Output
 
 
 03/21 1600 03/21 0800 03/21 0000 03/20 1600 03/20 0800 03/20 0000
 
Intake Total  50 240  110 
 
Output Total    400 500 
 
Balance  50 240 -400 -390 
 
       
 
Intake, IV     10 
 
Intake, Oral  50 240  100 
 
Number  0    
 
Bowel      
 
Movements      
 
Output, Urine    400 500 
 
Patient   205 lb  209 lb 230 lb
 
Weight      
 
Weight   Bed scale  Bed scale Reported by Patient
 
Measurement      
 
Method      
 
 
 
Physical Exam:
Awake alert cooperative
Sitting up in bed
Head atraumatic
Neck supple
Extremities without clubbing cyanosis or edema
 
Neurologic exam:
Awake alert oriented to person place and time
Speech and language intact
Cranial nerves: Visual fields full to confrontation
Extraocular movements full
Mild right lower facial weakness
Symmetric elevation of the uvula and palate
Symmetric shoulder shrug
Midline tongue protrusion
Grossly intact hearing
Motor: Normal muscle bulk and tone
Right upper extremity strength 4+ out of 5 right lower extremity 4-4+ out of 5
Mild to moderately impaired rapid alternating and fine motor movements on the 
right
Left arm and leg strength and dexterity normal
No limb ataxia
Light touch sensation intact
Gait not tested
Current Medications:
 Current Medications
 
 
  Sig/Homero Start time  Last
 
Medication Dose Route Stop Time Status Admin
 
Acetaminophen 650 MG Q6P PRN 03/20 0030 AC 
 
  PO   
 
Aspirin 81 MG DAILY 03/20 1000 AC 03/21
 
  PO   0900
 
Atorvastatin Calcium 80 MG 1700 03/20 1700 AC 03/20
 
  PO   1707
 
Enoxaparin Sodium 40 MG DAILY 03/20 1000 AC 03/21
 
  SC   0900
 
Hydrochlorothiazide 12.5 MG DAILY 03/21 1000 AC 03/21
 
  PO   0900
 
Insulin Aspart 0 TIDAC 03/20 0800 AC 03/20
 
  SC   1317
 
Labetalol HCl 200 MG BID 03/20 2200 DC 
 
  PO   
 
Labetalol HCl 100 MG BID 03/20 2200 AC 03/20
 
  PO   2138
 
Lisinopril 20 MG DAILY 03/20 1000 AC 03/21
 
  PO   0900
 
Patient Medication  1 ED ONE ONE 03/20 1700 DC 
 
Teaching  ED 03/20 1701  
 
 
 
 
Results
Last 24 Hours of Lab Results:
 Laboratory Tests
 
 
 03/21 0600
 
Chemistry 
 
  Sodium (137 - 145 mmol/L) 141
 
  Potassium (3.5 - 5.1 mmol/L) 3.8
 
  Chloride (98 - 107 mmol/L) 101
 
  Carbon Dioxide (22 - 30 mmol/L) 29
 
  Anion Gap (5 - 16) 12
 
  BUN (9 - 20 mg/dL) 16
 
  Creatinine (0.7 - 1.2 mg/dL) 0.9
 
  Estimated GFR (>60 ml/min) > 60
 
  BUN/Creatinine Ratio (7 - 25 %) 17.8
 
 
 
Recent Imaging Studies:
  SERVICE DATE: 03/21/18-
EXAM TYPE: MRI - MRI-HEAD W/O GUNJAN
 
EXAMINATION:
MR BRAIN WITHOUT CONTRAST
 
CLINICAL INFORMATION:
Right-sided weakness. Question ischemic stroke.
 
COMPARISON:
CT scan of the head 03/19/2018.
 
TECHNIQUE:
MRI of the brain without contrast was obtained using routine sequences.
 
FINDINGS:
There are foci of restricted diffusion involving the left putamen and left
caudate tail. These findings are consistent with acute ischemia. Chronic
lacunar infarcts are visualized within both thalami and the left caudate
nucleus. There is also a small focus of encephalomalacia and gliosis
involving the right occipital lobe consistent with chronic changes of an old
right posterior cerebral artery territory infarct. Scattered chronic small
vessel ischemic changes also visualized within the periventricular white
matter. No pathological magnetic susceptibility artifact. Intracranial
vascular flow voids are grossly maintained.
 
There is no intracranial mass effect or midline shift. No abnormal
extra-axial collection. Lateral and third ventricles are proportionate to the
subarachnoid spaces. No hydrocephalus. Midline structures including the
cervicomedullary junction are normal. Bone marrow signal intensity is normal.
There is no mastoid or middle ear effusion. Visualized paranasal sinuses are
well-aerated with exception of mild disease within ethmoid air cells. Globes
and orbits are symmetric.
 
IMPRESSION:
There are a few small acute striatocapsular infarcts. These acute findings
are superimposed upon numerous chronic ischemic changes as described above.
No evidence of acute hemorrhage. No mass effect, midline shift, or
hydrocephalus.
 
DICTATED BY: Kayode Reagan MD 
DATE/TIME DICTATED:03/21/18 / 1041
:DEBORAH 
DATE/TIME TRANSCRIBED:03/21/18 / 1041
 
CONFIDENTIAL, DO NOT COPY WITHOUT APPROPRIATE AUTHORIZATION.
 
 <Electronically signed in Other Vendor System>                                 
          
                                           SIGNED BY: Kayode Reagan MD 03/21
/18 1051
 
 
 
 
Carotid US:
IMPRESSION:
No hemodynamically significant narrowing is noted throughout either left or
right carotid systems. As noted, antegrade flow is demonstrated within both
left and right vertebral arteries.
 
DICTATED BY: Freya Mujica MD 
DATE/TIME DICTATED:03/20/18 / 1249
 
Hd CT:
IMPRESSION: Scattered areas of decreased attenuation here are noted. Most
left-sided. Some these may represent old infarcts but others are more
indeterminate and may be acute or subacute in nature. Given the history MR
including diffusion-weighted imaging would be recommended to further
evaluate. Importantly there is no hemorrhage or midline shift or mass effect
here.
 
DICTATED BY: Calderon Flores MD 
DATE/TIME DICTATED:03/19/18 / 2120
 
Assessment/Plan
Assessment:
52-year-old man with diabetes hypertension hyperlipidemia, with reduced 
medication adherence prior to admission, MRI evidence of cerebral 
atherosclerosis, acute left-sided MCA territory subcortical infarcts
Old subcortical subclinical infarcts
Plan:
Continue aspirin and statin therapy
Provide stroke education materials
Reinforce medication adherence for secondary stroke prevention
Continue PT and OT and DVT prophylaxis
He is an excellent candidate for acute inpatient rehabilitation at the [facility] unit
I have spoken with the continuing care manager here and with the nurse liaison 
for the [facility] unit

## 2018-03-22 VITALS — DIASTOLIC BLOOD PRESSURE: 84 MMHG | SYSTOLIC BLOOD PRESSURE: 146 MMHG

## 2018-03-22 VITALS — DIASTOLIC BLOOD PRESSURE: 84 MMHG | SYSTOLIC BLOOD PRESSURE: 150 MMHG

## 2018-03-22 LAB
ABSOLUTE GRANULOCYTE CT: 5.8 /CUMM (ref 1.4–6.5)
BASOPHILS # BLD: 0 /CUMM (ref 0–0.2)
BASOPHILS NFR BLD: 0.4 % (ref 0–2)
EOSINOPHIL # BLD: 0.2 /CUMM (ref 0–0.7)
EOSINOPHIL NFR BLD: 2.1 % (ref 0–5)
ERYTHROCYTE [DISTWIDTH] IN BLOOD BY AUTOMATED COUNT: 13.8 % (ref 11.5–14.5)
GRANULOCYTES NFR BLD: 64.5 % (ref 42.2–75.2)
HCT VFR BLD CALC: 39.7 % (ref 42–52)
LYMPHOCYTES # BLD: 1.8 /CUMM (ref 1.2–3.4)
MCH RBC QN AUTO: 28.6 PG (ref 27–31)
MCHC RBC AUTO-ENTMCNC: 33.6 G/DL (ref 33–37)
MCV RBC AUTO: 85.3 FL (ref 80–94)
MONOCYTES # BLD: 1.2 /CUMM (ref 0.1–0.6)
PLATELET # BLD: 212 /CUMM (ref 130–400)
PMV BLD AUTO: 10.7 FL (ref 7.4–10.4)
RED BLOOD CELL CT: 4.66 /CUMM (ref 4.7–6.1)
WBC # BLD AUTO: 9 /CUMM (ref 4.8–10.8)

## 2018-03-22 NOTE — PN- HOUSESTAFF
Andie Moore 18 0537:
Subjective
Follow-up For:
uncontrolled HTN
Ischemic stroke
 
Complaints: no complaints
Tele-Events Since Last Visit:
NSR, HR 46-67. 
Subjective:
 
Mr Hernandez was comfortable in the am. No new complaints. He feels improved compared
to yesterday. BP remained elevated. 
 
Review of Systems
Constitutional:
Reports: see HPI. 
 
Objective
Last 24 Hrs of Vital Signs/I&O
 Vital Signs
 
 
Date Time Temp Pulse Resp B/P B/P Pulse O2 O2 Flow FiO2
 
     Mean Ox Delivery Rate 
 
 2231 98.0  18   98   
 
 211  70  192/102     
 
 2112  70  192/102     
 
 2112  70  192/102     
 
 1600       Room Air  
 
 1436 97.9 66 18 163/95  97 Room Air  
 
 1300    170/80     
 
 1153  78  190/100     
 
 1055  80  190/116     
 
 0900  70  180/100     
 
 0718 98.6 55 18 152/82  95 Room Air  
 
 
 Intake & Output
 
 
  0800  0000  1600
 
Intake Total  360 500
 
Output Total   
 
Balance  360 500
 
    
 
Intake, Oral  360 500
 
Patient  205 lb 205 lb
 
Weight   
 
 
 
 
Physical Exam
General Appearance: No Acute Distress
Other Physical Findings:
Alert, Oriented X3, Cooperative, No Acute Distress;Skin: No Rashes, No Breakdown
Skin Temp/Moisture Exam: Warm/Dry;Sepsis Skin Exam (color): Normal for Ethnicity
;HEENT: Atraumatic, PERRLA, EOMI;Neck: Supple, No JVD;Lymphatic: Cervical nl;
Cardiovascular: Regular Rate, Normal S1, Normal S2, No Murmurs;Lungs: Normal Air
Movement;Abdomen: Normal Bowel Sounds, Soft, No Tenderness;Neurological: Normal 
Speech, Cranial Nerves 3-12 NL, Reflexes 2+, left sided facial asymmetry right 
sided weakness- strength 4/5 on RUE, RLE sensations decreased in RUE, RLE 
plantars down going finger nose test intact. gait not tested, reflexes right 
knee reflex 3+
Extremities: No Cyanosis, No Edema;Vascular: Pulses Symmetrical
Current Medications:
 Current Medications
 
 
  Sig/Homero Start time  Last
 
Medication Dose Route Stop Time Status Admin
 
Acetaminophen 650 MG Q6P PRN  0030 AC 
 
  PO   
 
Aspirin 81 MG DAILY  1000 AC 
 
  PO   0900
 
Atorvastatin Calcium 80 MG 1700  1700 AC 
 
  PO   1655
 
Enoxaparin Sodium 40 MG DAILY  1000 AC 
 
  SC   0900
 
Hydrochlorothiazide 12.5 MG DAILY  1000 AC 
 
  PO   0900
 
Insulin Aspart 0 TIDAC  0800 AC 
 
  SC   1317
 
Labetalol HCl 100 MG BID  2200 AC 
 
  PO   211
 
Lisinopril 40 MG DAILY  1000 AC 
 
  PO   
 
Lisinopril 20 MG ONCE ONE 2015 DC 
 
  PO 
 
Lisinopril 20 MG DAILY  1000 DC 
 
  PO   0900
 
Nifedipine 30 MG DAILY  1000 AC 
 
  PO   
 
 
 
 
Last 24 Hrs of Lab/Chon Results
Last 24 Hrs of Labs/Mics:
 Laboratory Tests
 
18 0600:
Anion Gap 12, Estimated GFR > 60, BUN/Creatinine Ratio 17.8
 
 
Assessment/Plan
Assessment:
Mr Hernandez is a 52 year old man w/ PMHx of hypertension, type 2 diabetes, 
hyperlipidemia, who has been noncompliant with his medication for the last 6 
months or so, came to the emergency room with a chief concern of  right-sided 
numbness and weakness RUE+RLE, blurring of vision, and imbalance while walking x
1 day. 
 
 
Pertinent lab findings in the last 24 hrs- 
 
WBC 12.3-->9.0, hemoglobin 13.3, platelets 269
potassium 3.4-->3.8, BUN 16, serum creatinine 0.9
 
MRI- 
 
There are a few small acute striatocapsular infarcts. These acute findings are 
superimposed upon numerous chronic ischemic changes as described above. No 
evidence of acute hemorrhage. No mass effect, midline shift, or hydrocephalus.
 
 
Etiology in this case with neurological deficits on right side of the body is 
most likely due to ischemic CVA on the internal capsule/MCA territory of left.  
The findings are consistent with ischemic changes in the internal capsule 
posterior limb.  Etiology could likely be due to uncontrolled hypertension, 
hyperlipidemia.  Considering the timing, he has been out of TPA window, but he 
received antiplatelet medications and which is a standard of care. 
 
Problem list:
#1 right-sided ischemic CVA, acute versus subacute
#2 hypertensive emergency
#3 type 2 diabetes
#4 hyperlipidemia
#4 Severe LVH
#5 HFpEF ( stage I diastolic dysfunction )
 
 
Plan:
 
#1 monitor the patient on telemetry
#2 neuro checks every 4
#3 Monitor BP closely to be able to titrate the doses of anti-hypertensives 
before dc.
#4 Continue oral antihypertensives, Labetol 100mg BID and lisinopril 40mg, HCTZ 
12.5 at his home dose, and Nifedepine ER 30mg. Advised the nursing staff to 
space out meds to get adequate control of BP.
#4 insulin sliding scale, Accu-Cheks. Would be able to dc him on higher dose of 
Metformin. HbA1c 7.7
#5 administer high intensity statin
#7 Physical Tx recs to be followed. 
#8 daily dose aspirin
#9 renal artery ultrasound to rule out secondary causes for hypertension, as 
recommended by the cardiologist.
 
Housekeeping:
#1 DVT prophylaxis-Lovenox subcutaneous
#2  Diet-heart healthy diet
#3 CODE STATUS-full code
Problem List:
 1. Hypertensive emergency
 
 2. CVA (cerebral vascular accident)
 
Pain Ratin
Pain Location:
none
Pain Goal: Remain pain free
Pain Plan:
tylenol prn
Tomorrow's Labs & Rationales:
no labs necessary
 
 
Adeline Rodriguez MD 18 1353:
Attending MD Review Statement
 
Attending Statement
Attending MD Statement: examined this patient, discuss w/resident/PA/NP, agreed 
w/resident/PA/NP, reviewed EMR data (avail), discussed with nursing, discussed 
with case mgmt, amended to note
Attending Assessment/Plan:
Patient seen and examined.  Resting comfortably and not in any acute distress.  
No significant events on telemetry overnight other than occasional bradycardia 
when asleep.  No complaints this morning.  Blood pressure has improved following
adjustment of his regimen yesterday.  He does continue to have some weakness in 
the right side however he is able to ambulate with a walker and has been cleared
by the physical therapy and occupational therapy service for discharge home to 
follow-up with outpatient physical therapy.  Patient is in agreement with this 
plan.
On account of his markedly elevated blood pressure the cardiology service has 
recommended obtaining renal sonogram to rule out renal artery stenosis.  This 
study has been ordered and we will follow the results.  Patient will be 
discharged later on today after the procedure.  Patient has been advised to 
resume outpatient follow-up with his primary care provider upon discharge.

## 2018-03-22 NOTE — ULTRASOUND REPORT
EXAMINATION:
US DOPPLER RENAL
 
CLINICAL INFORMATION:
Uncontrolled hypertension.
 
COMPARISON:
None.
 
TECHNIQUE:
Real-time imaging of the kidneys and bladder.
 
FINDINGS:
The abdominal aorta shows mild atherosclerotic disease. The pararenal
abdominal aorta shows a peak systolic velocity of 99 cm/s.
The peak systolic velocities within the proximal mid, distal renal arteries
are as follows (centimeters per second): (Some considered increased peak
systolic velocity of 180 cm/s as abnormal)
 
Right renal artery: Proximal: 141; mid: 80; distal 120.
 
Left renal artery: Proximal 104; mid 103; distal 121.
 
Renal aortic ratio (RAR): 1.4 on the right and 1.2 on the left (Greater than
3-3.5 is usually considered abnormal)
 
 
RIGHT KIDNEY: 11.7 x 6.1 x 4.7 cm (SAG x AP x TRV). The kidney is normal in
size, contour, and echogenicity. Renal cortical thickness is normal. No focal
parenchymal lesions. No hydronephrosis. 0.6 cm echogenic focus is noted at
the inferior pole without any acoustic shadowing, may represent prominent fat
versus subtle nonobstructing calculus.
 
LEFT KIDNEY: 11.8 x 4.9 x 4.6 cm (SAG x AP x TRV). The kidney is normal in
size, contour, and echogenicity. Renal cortical thickness is normal. No
calculi or focal parenchymal lesions. No hydronephrosis.
 
BLADDER: Well-distended and normal. Bilateral ureteral jets are demonstrated.
Prevoid volume measures 215 mL. No postvoid residual.
 
The prostate is visualized, measures 4.2 x 3.1 x 2.2 cm.
 
IMPRESSION:
 
1. No sonographic evidence of renal artery stenosis..
2. 0.6 cm echogenic focus is noted at the inferior pole of the right kidney,
may represent prominent fat versus subtle nonobstructing calculus.

## 2018-03-22 NOTE — EVENT NOTE
Event Note
Event Note:
S: Patient getting for discharge 
B: but seen by cardiology
A/R:
Lyme titer sent per request of Dr. White.  Patient instructed to f/u with Dr. White in 1 week.

## 2018-03-22 NOTE — PATIENT DISCHARGE INSTRUCTIONS
Discharge Instructions
 
General Discharge Information
You were seen/treated for:
Stroke
Watch for these problems:
#1 weakness in upper or lower extremities
#2 weakness of face, asymmetry of face, slurring of speech
#3 chest pain, palpitations, shortness of breath
Special Instructions:
-Please see your primary care provider within a week of discharge
-Please see your cardiologist within a week of discharge
-Please follow up with your neurologist within a week of discharge.
-Please take your medications as prescribed. 
 
Acute Coronary Syndrome
 
Inclusion Criteria
At DC or during hospital stay patient has or had the following:
ACS DIAGNOSIS No
 
Discharge Core Measures
Meds if any: Prescribed or Continued at Discharge
Meds if any: NOT Prescribed or Continued at Discharge
 
Congestive Heart Failure
 
Inclusion Criteria
At DC or during hospital stay patient has or had the following:
CHF DIAGNOSIS No
 
Discharge Core Measures
Meds if any: Prescribed or Continued at Discharge
Meds if any: NOT Prescribed or Continued at Discharge
 
Cerebrovascular accident
 
Inclusion Criteria
At DC or during hospital stay patient has or had the following:
CVA/TIA Diagnosis Yes
 
Discharge Core Measures
Meds if any: Prescribed or Continued at Discharge
Antithrombotic No
Statin (required if LDL =>70) No
Anticoagulant Yes
Meds if any: NOT Prescribed or Continued at Discharge
 
Venous thromboembolism
 
Inclusion Criteria
VTE Diagnosis No
VTE Type NONE
VTE Confirmed by (Test) NONE
 
Discharge Core Measures
- Per Current guidelines, there needs to be overlap
- treatment for the first 5 days of Warfarin therapy.
- If discharged on Warfarin prior to 5 days of
- overlap therapy, the patient will need to be
- assessed for post discharge needs including
- *Post discharge parental anticoagulation
- *Warfarin and/or parental anticoagulation education
- *Follow up date to check INR post discharge
At least 5 days overlap therapy as Inpatient No
Meds if any: Prescribed or Continued at Discharge
Note: Overlap Therapy is Warfarin and Anticoagulant
Meds if any: NOT Prescribed or Continued at Discharge